# Patient Record
Sex: FEMALE | Race: WHITE | NOT HISPANIC OR LATINO
[De-identification: names, ages, dates, MRNs, and addresses within clinical notes are randomized per-mention and may not be internally consistent; named-entity substitution may affect disease eponyms.]

---

## 2021-03-30 ENCOUNTER — APPOINTMENT (OUTPATIENT)
Dept: CARDIOTHORACIC SURGERY | Facility: CLINIC | Age: 76
End: 2021-03-30
Payer: MEDICARE

## 2021-03-30 VITALS
BODY MASS INDEX: 24.14 KG/M2 | OXYGEN SATURATION: 97 % | SYSTOLIC BLOOD PRESSURE: 132 MMHG | WEIGHT: 115 LBS | DIASTOLIC BLOOD PRESSURE: 66 MMHG | HEART RATE: 56 BPM | HEIGHT: 58 IN

## 2021-03-30 DIAGNOSIS — Z86.59 PERSONAL HISTORY OF OTHER MENTAL AND BEHAVIORAL DISORDERS: ICD-10-CM

## 2021-03-30 DIAGNOSIS — Z82.49 FAMILY HISTORY OF ISCHEMIC HEART DISEASE AND OTHER DISEASES OF THE CIRCULATORY SYSTEM: ICD-10-CM

## 2021-03-30 PROCEDURE — 99205 OFFICE O/P NEW HI 60 MIN: CPT

## 2021-03-30 RX ORDER — INSULIN ASPART 100 [IU]/ML
100 INJECTION, SOLUTION INTRAVENOUS; SUBCUTANEOUS
Refills: 0 | Status: COMPLETED | COMMUNITY
End: 2021-03-30

## 2021-03-31 NOTE — DATA REVIEWED
[FreeTextEntry1] : 3.23.21 Cardiac Catheterization at [facility] \par   LVEF 40% \par   LM:  Left Main:  Normal \par   LAD:\par      Mid LAD There was 95% stenosis \par     1st Diagonal There was 90% stenosis \par    CX:\par       Mid Circumflex:  There was a 100% stenosis \par    RCA: Distal RCA There was 95% stenosis \par Severe CAD \par \par 3.23.21 Transthoracic echocardiogram at [facility]  \par -Left ventricle The cavity is normal. Normal thickness is present. Mild -Moderate diffuse hypocontractility of the left  ventricle is present. LVEF  40% \par -Right ventricle. The cavity is normal. Right ventricular systolic function is normal \par -Mitral valve: The leaflet is mildly thickened. There is trace  mitral valve regurgitation \par -Aortic valve: The valve is trileaflet/ The leaflets are normal thickness. There is no aortic stenosis. No valvular aortic regurgitation. \par -Tricuspid valve: The tricuspid leaflet are not thickened. There is trace tricuspid valve regurgitation. \par -Pericardium extracardiac. There is no pericardial effusion. \par

## 2021-03-31 NOTE — PHYSICAL EXAM
[Sclera] : the sclera and conjunctiva were normal [Oriented To Time, Place, And Person] : oriented to person, place, and time [General Appearance - Alert] : alert [Hearing Threshold Finger Rub Not [place]] : hearing was normal [Neck Cervical Mass (___cm)] : no neck mass was observed [Jugular Venous Distention Increased] : there was no jugular-venous distention [Exaggerated Use Of Accessory Muscles For Inspiration] : no accessory muscle use [Heart Rate And Rhythm] : heart rate was normal and rhythm regular [Examination Of The Chest] : the chest was normal in appearance [Chest Visual Inspection Thoracic Asymmetry] : no chest asymmetry [Sensation] : the sensory exam was normal to light touch and pinprick [Motor Exam] : the motor exam was normal [Neck Appearance] : the appearance of the neck was normal [] : the neck was supple [Arterial Pulses Femoral] : femoral pulses were normal without bruits [Arterial Pulses Carotid] : carotid pulses were normal with no bruits [Edema] : there was no peripheral edema [Bowel Sounds] : normal bowel sounds [Abdomen Soft] : soft [No CVA Tenderness] : no ~M costovertebral angle tenderness [FreeTextEntry1] : mild antalgic no assistive devices used.  [Abnormal Walk] : normal gait [Skin Color & Pigmentation] : normal skin color and pigmentation [Skin Turgor] : normal skin turgor

## 2021-03-31 NOTE — HISTORY OF PRESENT ILLNESS
[FreeTextEntry1] : Ms. CHEUNG is a 76 year old female referred by Dr. Meyers who presents for consultation. Her past medical history includes HLD, HTN, DM II, and GERD. She reports to the office today for evaluation of severe CAD discovered on cardiac catheterization at [facility] and echocardiogram 3.23.21. \par \par On  admission at [facility] her  chief complaints were chest pain, wheezing, clammy, perspiration,  shortness of breath, and weakness as per son. She was diagnosed with  elevated Troponin  and multivessel CAD.  She had several admissions at [facility] for similar symptoms. She had refused cardiac catheterization  in previous admission as per son. She is  under the care of  Dr. Sandoval, but has not yet seen him in over 1 year. Recommendation during her 3.23.21 admission at [facility]  was for open heart surgery,  but patient declined for reasons of seeking a second opinion. She is  here to discuss interventional management.

## 2021-03-31 NOTE — ASSESSMENT
[FreeTextEntry1] : I had the pleasure of evaluating Jose A. She  is accompanied by her son. Today I have  discussed symptoms in relation  with her 3.23.21 Transthoracic echocardiogram and cardiac catheterization at [facility]. \par On  admission at [facility] her chief complaints were chest pain, wheezing, clammy, perspiration,  shortness of breath, and weakness as per son. She was diagnosed with  elevated Troponin  and multivessel CAD.  She had several admissions at [facility] for similar symptoms prior. She had refused cardiac catheterization  in previous admission as per son. She is  under the care of  Dr. Sandoval, but has not yet seen him in over 1 year. After completing cardiac images  on 3.23.21 admission at [facility]  the recommendation was for open heart surgery,  but patient declined for reasons of seeking a second opinion. \par \par The patient's past medical history, past surgical history, family history, social history, allergies, medications, and multisystem review of systems were individually reviewed with the patient. The patient was personally seen and examined. I'm recommending and will schedule her CABG x 4  and a follow up with  Dr. Sandoval. Pre-operative education and post-operative activity discussed at length. All risks (1-2%), benefits, and alternatives discussed at length with patient. Expectations reviewed with patient. All questions addressed.  Patient agrees and will adhere with plan. \par \par PLAN: \par -Schedule CABG x  4  \par -Follow up Dr. Khan \par \par ICarmelita NP am scribing for and in the presence of Dr. Valenzuela the following sections HISTORY OF PRESENT ILLNESS, PAST MEDICAL/FAMILY/SOCIAL HISTORY; REVIEW OF SYSTEMS; VITAL SIGNS; PHYSICAL EXAM; DISPOSITION.\par \par "I personally performed the services described in the documentation, reviewed the documentation recorded by the scribe in my presence and accurately and completely records my words and actions."\par \par  \par

## 2021-03-31 NOTE — CONSULT LETTER
[Dear  ___] : Dear  [unfilled], [Consult Letter:] : I had the pleasure of evaluating your patient, [unfilled]. [Please see my note below.] : Please see my note below. [Consult Closing:] : Thank you very much for allowing me to participate in the care of this patient.  If you have any questions, please do not hesitate to contact me. [Sincerely,] : Sincerely, [DrHelena  ___] : Dr. CORDON [FreeTextEntry2] : Malik Meyers MD [FreeTextEntry3] : Cory Valenzuela MD\par  of Cardiothoracic Surgery\par [facility]\par [address] [place] [address] \[place] [address]\par [phone]\par

## 2021-04-11 ENCOUNTER — APPOINTMENT (OUTPATIENT)
Dept: DISASTER EMERGENCY | Facility: CLINIC | Age: 76
End: 2021-04-11

## 2021-04-12 LAB — SARS-COV-2 N GENE NPH QL NAA+PROBE: NOT DETECTED

## 2021-04-14 ENCOUNTER — OUTPATIENT (OUTPATIENT)
Dept: OUTPATIENT SERVICES | Facility: HOSPITAL | Age: 76
LOS: 1 days | End: 2021-04-14
Payer: MEDICARE

## 2021-04-14 ENCOUNTER — APPOINTMENT (OUTPATIENT)
Dept: PULMONOLOGY | Facility: CLINIC | Age: 76
End: 2021-04-14
Payer: MEDICARE

## 2021-04-14 ENCOUNTER — RESULT REVIEW (OUTPATIENT)
Age: 76
End: 2021-04-14

## 2021-04-14 VITALS
WEIGHT: 112.88 LBS | RESPIRATION RATE: 18 BRPM | HEART RATE: 78 BPM | DIASTOLIC BLOOD PRESSURE: 50 MMHG | HEIGHT: 57 IN | SYSTOLIC BLOOD PRESSURE: 98 MMHG | TEMPERATURE: 98 F

## 2021-04-14 VITALS — HEIGHT: 58 IN | TEMPERATURE: 98.6 F | BODY MASS INDEX: 23.72 KG/M2 | WEIGHT: 113 LBS

## 2021-04-14 DIAGNOSIS — Z29.9 ENCOUNTER FOR PROPHYLACTIC MEASURES, UNSPECIFIED: ICD-10-CM

## 2021-04-14 DIAGNOSIS — I25.10 ATHEROSCLEROTIC HEART DISEASE OF NATIVE CORONARY ARTERY WITHOUT ANGINA PECTORIS: ICD-10-CM

## 2021-04-14 DIAGNOSIS — Z98.891 HISTORY OF UTERINE SCAR FROM PREVIOUS SURGERY: Chronic | ICD-10-CM

## 2021-04-14 DIAGNOSIS — Z13.89 ENCOUNTER FOR SCREENING FOR OTHER DISORDER: ICD-10-CM

## 2021-04-14 DIAGNOSIS — E11.9 TYPE 2 DIABETES MELLITUS WITHOUT COMPLICATIONS: ICD-10-CM

## 2021-04-14 DIAGNOSIS — I10 ESSENTIAL (PRIMARY) HYPERTENSION: ICD-10-CM

## 2021-04-14 DIAGNOSIS — Z01.818 ENCOUNTER FOR OTHER PREPROCEDURAL EXAMINATION: ICD-10-CM

## 2021-04-14 LAB
A1C WITH ESTIMATED AVERAGE GLUCOSE RESULT: 9.4 % — HIGH (ref 4–5.6)
ALBUMIN SERPL ELPH-MCNC: 4.2 G/DL — SIGNIFICANT CHANGE UP (ref 3.3–5.2)
ALP SERPL-CCNC: 104 U/L — SIGNIFICANT CHANGE UP (ref 40–120)
ALT FLD-CCNC: 11 U/L — SIGNIFICANT CHANGE UP
ANION GAP SERPL CALC-SCNC: 14 MMOL/L — SIGNIFICANT CHANGE UP (ref 5–17)
APPEARANCE UR: CLEAR — SIGNIFICANT CHANGE UP
APTT BLD: 30.3 SEC — SIGNIFICANT CHANGE UP (ref 27.5–35.5)
AST SERPL-CCNC: 15 U/L — SIGNIFICANT CHANGE UP
BACTERIA # UR AUTO: ABNORMAL
BASOPHILS # BLD AUTO: 0.06 K/UL — SIGNIFICANT CHANGE UP (ref 0–0.2)
BASOPHILS NFR BLD AUTO: 0.8 % — SIGNIFICANT CHANGE UP (ref 0–2)
BILIRUB SERPL-MCNC: 0.3 MG/DL — LOW (ref 0.4–2)
BILIRUB UR-MCNC: NEGATIVE — SIGNIFICANT CHANGE UP
BLD GP AB SCN SERPL QL: SIGNIFICANT CHANGE UP
BUN SERPL-MCNC: 37 MG/DL — HIGH (ref 8–20)
CALCIUM SERPL-MCNC: 9.2 MG/DL — SIGNIFICANT CHANGE UP (ref 8.6–10.2)
CHLORIDE SERPL-SCNC: 101 MMOL/L — SIGNIFICANT CHANGE UP (ref 98–107)
CO2 SERPL-SCNC: 25 MMOL/L — SIGNIFICANT CHANGE UP (ref 22–29)
COLOR SPEC: YELLOW — SIGNIFICANT CHANGE UP
CREAT SERPL-MCNC: 1.1 MG/DL — SIGNIFICANT CHANGE UP (ref 0.5–1.3)
DIFF PNL FLD: ABNORMAL
EOSINOPHIL # BLD AUTO: 0.44 K/UL — SIGNIFICANT CHANGE UP (ref 0–0.5)
EOSINOPHIL NFR BLD AUTO: 5.5 % — SIGNIFICANT CHANGE UP (ref 0–6)
EPI CELLS # UR: SIGNIFICANT CHANGE UP
ESTIMATED AVERAGE GLUCOSE: 223 MG/DL — HIGH (ref 68–114)
GLUCOSE SERPL-MCNC: 290 MG/DL — HIGH (ref 70–99)
GLUCOSE UR QL: NEGATIVE MG/DL — SIGNIFICANT CHANGE UP
HCT VFR BLD CALC: 44.8 % — SIGNIFICANT CHANGE UP (ref 34.5–45)
HGB BLD-MCNC: 13.9 G/DL — SIGNIFICANT CHANGE UP (ref 11.5–15.5)
IMM GRANULOCYTES NFR BLD AUTO: 0.3 % — SIGNIFICANT CHANGE UP (ref 0–1.5)
INR BLD: 1.05 RATIO — SIGNIFICANT CHANGE UP (ref 0.88–1.16)
KETONES UR-MCNC: NEGATIVE — SIGNIFICANT CHANGE UP
LEUKOCYTE ESTERASE UR-ACNC: NEGATIVE — SIGNIFICANT CHANGE UP
LYMPHOCYTES # BLD AUTO: 2.18 K/UL — SIGNIFICANT CHANGE UP (ref 1–3.3)
LYMPHOCYTES # BLD AUTO: 27.4 % — SIGNIFICANT CHANGE UP (ref 13–44)
MCHC RBC-ENTMCNC: 26.3 PG — LOW (ref 27–34)
MCHC RBC-ENTMCNC: 31 GM/DL — LOW (ref 32–36)
MCV RBC AUTO: 84.8 FL — SIGNIFICANT CHANGE UP (ref 80–100)
MONOCYTES # BLD AUTO: 0.67 K/UL — SIGNIFICANT CHANGE UP (ref 0–0.9)
MONOCYTES NFR BLD AUTO: 8.4 % — SIGNIFICANT CHANGE UP (ref 2–14)
NEUTROPHILS # BLD AUTO: 4.59 K/UL — SIGNIFICANT CHANGE UP (ref 1.8–7.4)
NEUTROPHILS NFR BLD AUTO: 57.6 % — SIGNIFICANT CHANGE UP (ref 43–77)
NITRITE UR-MCNC: NEGATIVE — SIGNIFICANT CHANGE UP
NT-PROBNP SERPL-SCNC: 2438 PG/ML — HIGH (ref 0–300)
PH UR: 6 — SIGNIFICANT CHANGE UP (ref 5–8)
PLATELET # BLD AUTO: 292 K/UL — SIGNIFICANT CHANGE UP (ref 150–400)
POTASSIUM SERPL-MCNC: 4.3 MMOL/L — SIGNIFICANT CHANGE UP (ref 3.5–5.3)
POTASSIUM SERPL-SCNC: 4.3 MMOL/L — SIGNIFICANT CHANGE UP (ref 3.5–5.3)
PREALB SERPL-MCNC: 28 MG/DL — SIGNIFICANT CHANGE UP (ref 18–38)
PROT SERPL-MCNC: 7.7 G/DL — SIGNIFICANT CHANGE UP (ref 6.6–8.7)
PROT UR-MCNC: 30 MG/DL
PROTHROM AB SERPL-ACNC: 12.2 SEC — SIGNIFICANT CHANGE UP (ref 10.6–13.6)
RBC # BLD: 5.28 M/UL — HIGH (ref 3.8–5.2)
RBC # FLD: 15.3 % — HIGH (ref 10.3–14.5)
RBC CASTS # UR COMP ASSIST: SIGNIFICANT CHANGE UP /HPF (ref 0–4)
SODIUM SERPL-SCNC: 140 MMOL/L — SIGNIFICANT CHANGE UP (ref 135–145)
SP GR SPEC: 1.01 — SIGNIFICANT CHANGE UP (ref 1.01–1.02)
T3 SERPL-MCNC: 84 NG/DL — SIGNIFICANT CHANGE UP (ref 80–200)
T4 AB SER-ACNC: 5.7 UG/DL — SIGNIFICANT CHANGE UP (ref 4.5–12)
TSH SERPL-MCNC: 2.75 UIU/ML — SIGNIFICANT CHANGE UP (ref 0.27–4.2)
UROBILINOGEN FLD QL: NEGATIVE MG/DL — SIGNIFICANT CHANGE UP
WBC # BLD: 7.96 K/UL — SIGNIFICANT CHANGE UP (ref 3.8–10.5)
WBC # FLD AUTO: 7.96 K/UL — SIGNIFICANT CHANGE UP (ref 3.8–10.5)
WBC UR QL: SIGNIFICANT CHANGE UP

## 2021-04-14 PROCEDURE — 94729 DIFFUSING CAPACITY: CPT

## 2021-04-14 PROCEDURE — 71046 X-RAY EXAM CHEST 2 VIEWS: CPT

## 2021-04-14 PROCEDURE — G0463: CPT

## 2021-04-14 PROCEDURE — 93005 ELECTROCARDIOGRAM TRACING: CPT

## 2021-04-14 PROCEDURE — 94010 BREATHING CAPACITY TEST: CPT

## 2021-04-14 PROCEDURE — 93010 ELECTROCARDIOGRAM REPORT: CPT

## 2021-04-14 PROCEDURE — 93880 EXTRACRANIAL BILAT STUDY: CPT | Mod: 26

## 2021-04-14 PROCEDURE — 94727 GAS DIL/WSHOT DETER LNG VOL: CPT

## 2021-04-14 PROCEDURE — 71046 X-RAY EXAM CHEST 2 VIEWS: CPT | Mod: 26

## 2021-04-14 PROCEDURE — 85018 HEMOGLOBIN: CPT | Mod: QW

## 2021-04-14 RX ORDER — ISOSORBIDE MONONITRATE 60 MG/1
0 TABLET, EXTENDED RELEASE ORAL
Qty: 0 | Refills: 0 | DISCHARGE

## 2021-04-14 RX ORDER — ISOSORBIDE MONONITRATE 60 MG/1
30 TABLET, EXTENDED RELEASE ORAL
Qty: 0 | Refills: 0 | DISCHARGE

## 2021-04-14 RX ORDER — LOSARTAN POTASSIUM 100 MG/1
20 TABLET, FILM COATED ORAL
Qty: 0 | Refills: 0 | DISCHARGE

## 2021-04-14 RX ORDER — GLIMEPIRIDE 1 MG
1 TABLET ORAL
Qty: 0 | Refills: 0 | DISCHARGE

## 2021-04-14 RX ORDER — LOSARTAN POTASSIUM 100 MG/1
0 TABLET, FILM COATED ORAL
Qty: 0 | Refills: 0 | DISCHARGE

## 2021-04-14 NOTE — H&P PST ADULT - NSANTHOSAYNRD_GEN_A_CORE
No. ATTILA screening performed.  STOP BANG Legend: 0-2 = LOW Risk; 3-4 = INTERMEDIATE Risk; 5-8 = HIGH Risk

## 2021-04-14 NOTE — H&P PST ADULT - NSICDXPASTMEDICALHX_GEN_ALL_CORE_FT
PAST MEDICAL HISTORY:  CAD (coronary artery disease)     H/O myocardial ischemia     Hyperlipemia     Hypertension     Type 2 diabetes mellitus      PAST MEDICAL HISTORY:  At risk for sleep apnea Bang 5    CAD (coronary artery disease)     Congestive heart failure     H/O myocardial ischemia     Hyperlipemia     Hypertension     Type 2 diabetes mellitus      PAST MEDICAL HISTORY:  At risk for sleep apnea Bang 5    CAD (coronary artery disease)     Congestive heart failure     Dementia     H/O myocardial ischemia     Hyperlipemia     Hypertension     Type 2 diabetes mellitus

## 2021-04-14 NOTE — H&P PST ADULT - EKG AND INTERPRETATION
Sinus bradycardia  ST & T wave abnormality, consider inferior ischemia  ST & T wave abnormality, consider anterolateral ischemia  Pending final interpretation

## 2021-04-14 NOTE — H&P PST ADULT - HISTORY OF PRESENT ILLNESS
Patient reports a history of heart disease for about 20 years. Reports that she was told about a mnth ago she might had a myocardial infarction. reports that she had 3-4 vessels blockage C/o upper back pain. Deneis any fever, chills, SOB 75 y/o female presents to PST today, Patient reports a history of heart disease for about 20 years. Reports that she was told about a month ago that she had a myocardial infarction. As per son, Patient has about 3-4 vessels blockage.  C/o upper chest pain  radiating through upper back. Denies any fever, SOB, Chills. Scheduled for CABG x4 with Dr. Valenzuela 4/19/2021.  75 y/o female presents to [facility] today. Patient reports a history of heart disease for about 20 years. Reports that she was told about a month ago that she had a myocardial infarction. As per son, Patient has about 3-4 vessels blockages in the heart. Patient c/o chest pain  radiating through right upper back. Denies any fever, SOB, Chills. Scheduled for CABG x4 with Dr. Valenzuela 4/19/2021.  77 y/o female presents to [facility] today. Patient reports a history of heart disease for about 20 years. Reports that she was told about a month ago that she had a myocardial infarction. As per son, Patient has about 3-4 nblocked vessels in the heart. Patient c/o chest pain  radiating through right upper back. Denies any fever, SOB, Chills. Scheduled for CABG x4 with Dr. Valenzuela 4/19/2021.

## 2021-04-14 NOTE — H&P PST ADULT - ASSESSMENT
75 y/o female scheduled for CaBG x4 with Dr. Nicolas Ray on 2021    OPIOID RISK TOOL    JANNY EACH BOX THAT APPLIES AND ADD TOTALS AT THE END    FAMILY HISTORY OF SUBSTANCE ABUSE                 FEMALE         MALE                                                Alcohol                             [  ]1 pt          [  ]3pts                                               Illegal Durgs                     [  ]2 pts        [  ]3pts                                               Rx Drugs                           [  ]4 pts        [  ]4 pts    PERSONAL HISTORY OF SUBSTANCE ABUSE                                                                                          Alcohol                             [  ]3 pts       [  ]3 pts                                               Illegal Drugs                     [  ]4 pts        [  ]4 pts                                               Rx Drugs                           [  ]5 pts        [  ]5 pts    AGE BETWEEN 16-45 YEARS                                      [  ]1 pt         [  ]1 pt    HISTORY OF PREADOLESCENT   SEXUAL ABUSE                                                             [  ]3 pts        [  ]0pts    PSYCHOLOGICAL DISEASE                     ADD, OCD, Bipolar, Schizophrenia        [  ]2 pts         [  ]2 pts                      Depression                                               [x  ]1 pt           [  ]1 pt           SCORING TOTAL   (add numbers and type here)              (1)                                     A score of 3 or lower indicated LOW risk for future opioid abuse  A score of 4 to 7 indicated moderate risk for future opioid abuse  A score of 8 or higher indicates a high risk for opioid abuse    CAPRINI SCORE [CLOT]    AGE RELATED RISK FACTORS                                                       MOBILITY RELATED FACTORS  [ ] Age 41-60 years                                            (1 Point)                  [ ] Bed rest                                                        (1 Point)  [ ] Age: 61-74 years                                           (2 Points)                 [ ] Plaster cast                                                   (2 Points)  [ ] Age= 75 years                                              (3 Points)                 [ ] Bed bound for more than 72 hours                 (2 Points)    DISEASE RELATED RISK FACTORS                                               GENDER SPECIFIC FACTORS  [ ] Edema in the lower extremities                       (1 Point)                  [ ] Pregnancy                                                     (1 Point)  [ ] Varicose veins                                               (1 Point)                  [ ] Post-partum < 6 weeks                                   (1 Point)             [ ] BMI > 25 Kg/m2                                            (1 Point)                  [ ] Hormonal therapy  or oral contraception          (1 Point)                 [ ] Sepsis (in the previous month)                        (1 Point)                  [ ] History of pregnancy complications                 (1 point)  [ ] Pneumonia or serious lung disease                                               [ ] Unexplained or recurrent                     (1 Point)           (in the previous month)                               (1 Point)  [ ] Abnormal pulmonary function test                     (1 Point)                 SURGERY RELATED RISK FACTORS  [ ] Acute myocardial infarction                              (1 Point)                 [ ]  Section                                             (1 Point)  [ x] Congestive heart failure (in the previous month)  (1 Point)               [ ] Minor surgery                                                  (1 Point)   [ ] Inflammatory bowel disease                             (1 Point)                 [ ] Arthroscopic surgery                                        (2 Points)  [ ] Central venous access                                      (2 Points)                [x ] General surgery lasting more than 45 minutes   (2 Points)       [ ] Stroke (in the previous month)                          (5 Points)               [ ] Elective arthroplasty                                         (5 Points)                                                                                                                                               HEMATOLOGY RELATED FACTORS                                                 TRAUMA RELATED RISK FACTORS  [ ] Prior episodes of VTE                                     (3 Points)                [ ] Fracture of the hip, pelvis, or leg                       (5 Points)  [ ] Positive family history for VTE                         (3 Points)                 [ ] Acute spinal cord injury (in the previous month)  (5 Points)  [ ] Prothrombin 30025 A                                     (3 Points)                 [ ] Paralysis  (less than 1 month)                             (5 Points)  [ ] Factor V Leiden                                             (3 Points)                  [ ] Multiple Trauma within 1 month                        (5 Points)  [ ] Lupus anticoagulants                                     (3 Points)                                                           [ ] Anticardiolipin antibodies                               (3 Points)                                                       [ ] High homocysteine in the blood                      (3 Points)                                             [ ] Other congenital or acquired thrombophilia      (3 Points)                                                [ ] Heparin induced thrombocytopenia                  (3 Points)                                          Total Score [     3    ]    Caprini Score 0 - 2:  Low Risk, No VTE Prophylaxis required for most patients, encourage ambulation  Caprini Score 3 - 6:  At Risk, pharmacologic VTE prophylaxis is indicated for most patients (in the absence of a contraindication)  Caprini Score Greater than or = 7:  High Risk, pharmacologic VTE prophylaxis is indicated for most patients (in the absence of a contraindication)

## 2021-04-14 NOTE — H&P PST ADULT - NSICDXFAMILYHX_GEN_ALL_CORE_FT
FAMILY HISTORY:  Sibling  Still living? Yes, Estimated age: 71-80  FH: diabetes mellitus, Age at diagnosis: Age Unknown  FH: heart disease, Age at diagnosis: Age Unknown    Child  Still living? Unknown  FH: diabetes mellitus, Age at diagnosis: Age Unknown

## 2021-04-14 NOTE — H&P PST ADULT - NSICDXPROBLEM_GEN_ALL_CORE_FT
PROBLEM DIAGNOSES  Problem: Screening for substance abuse  Assessment and Plan: ORT 1    Problem: Need for prophylactic measure  Assessment and Plan: Caprini 3- Moderate risk  Primary team please assess need for DVT prophylaxis    Problem: Diabetes mellitus  Assessment and Plan: Follows with PCP    Problem: Hypertension  Assessment and Plan: Follows with PCP    Problem: Atherosclerotic heart disease of native coronary artery without angina pectoris  Assessment and Plan: Scheduled for coronary artery bypass graft x4 with Dr. Valenzuela 4/19/2021

## 2021-04-15 DIAGNOSIS — Z01.818 ENCOUNTER FOR OTHER PREPROCEDURAL EXAMINATION: ICD-10-CM

## 2021-04-15 LAB
CULTURE RESULTS: SIGNIFICANT CHANGE UP
MRSA PCR RESULT.: SIGNIFICANT CHANGE UP
S AUREUS DNA NOSE QL NAA+PROBE: SIGNIFICANT CHANGE UP
SPECIMEN SOURCE: SIGNIFICANT CHANGE UP

## 2021-04-16 ENCOUNTER — APPOINTMENT (OUTPATIENT)
Dept: DISASTER EMERGENCY | Facility: CLINIC | Age: 76
End: 2021-04-16

## 2021-04-17 LAB — SARS-COV-2 N GENE NPH QL NAA+PROBE: NOT DETECTED

## 2021-04-19 ENCOUNTER — RESULT REVIEW (OUTPATIENT)
Age: 76
End: 2021-04-19

## 2021-04-19 ENCOUNTER — INPATIENT (INPATIENT)
Facility: HOSPITAL | Age: 76
LOS: 6 days | Discharge: HOME | DRG: 235 | End: 2021-04-26
Attending: THORACIC SURGERY (CARDIOTHORACIC VASCULAR SURGERY) | Admitting: THORACIC SURGERY (CARDIOTHORACIC VASCULAR SURGERY)
Payer: MEDICARE

## 2021-04-19 ENCOUNTER — APPOINTMENT (OUTPATIENT)
Dept: CARDIOTHORACIC SURGERY | Facility: HOSPITAL | Age: 76
End: 2021-04-19

## 2021-04-19 VITALS
DIASTOLIC BLOOD PRESSURE: 115 MMHG | SYSTOLIC BLOOD PRESSURE: 138 MMHG | RESPIRATION RATE: 16 BRPM | WEIGHT: 112.88 LBS | OXYGEN SATURATION: 99 % | HEIGHT: 55 IN | HEART RATE: 63 BPM | TEMPERATURE: 99 F

## 2021-04-19 DIAGNOSIS — Z98.891 HISTORY OF UTERINE SCAR FROM PREVIOUS SURGERY: Chronic | ICD-10-CM

## 2021-04-19 DIAGNOSIS — I25.10 ATHEROSCLEROTIC HEART DISEASE OF NATIVE CORONARY ARTERY WITHOUT ANGINA PECTORIS: ICD-10-CM

## 2021-04-19 LAB
ABO RH CONFIRMATION: SIGNIFICANT CHANGE UP
ALBUMIN SERPL ELPH-MCNC: 3.9 G/DL — SIGNIFICANT CHANGE UP (ref 3.3–5.2)
ALP SERPL-CCNC: 47 U/L — SIGNIFICANT CHANGE UP (ref 40–120)
ALT FLD-CCNC: 8 U/L — SIGNIFICANT CHANGE UP
ANION GAP SERPL CALC-SCNC: 13 MMOL/L — SIGNIFICANT CHANGE UP (ref 5–17)
AST SERPL-CCNC: 22 U/L — SIGNIFICANT CHANGE UP
BILIRUB SERPL-MCNC: 0.5 MG/DL — SIGNIFICANT CHANGE UP (ref 0.4–2)
BUN SERPL-MCNC: 22 MG/DL — HIGH (ref 8–20)
CALCIUM SERPL-MCNC: 8.7 MG/DL — SIGNIFICANT CHANGE UP (ref 8.6–10.2)
CHLORIDE SERPL-SCNC: 105 MMOL/L — SIGNIFICANT CHANGE UP (ref 98–107)
CO2 SERPL-SCNC: 23 MMOL/L — SIGNIFICANT CHANGE UP (ref 22–29)
CREAT SERPL-MCNC: 0.69 MG/DL — SIGNIFICANT CHANGE UP (ref 0.5–1.3)
GAS PNL BLDA: SIGNIFICANT CHANGE UP
GAS PNL BLDA: SIGNIFICANT CHANGE UP
GLUCOSE BLDC GLUCOMTR-MCNC: 132 MG/DL — HIGH (ref 70–99)
GLUCOSE BLDC GLUCOMTR-MCNC: 145 MG/DL — HIGH (ref 70–99)
GLUCOSE BLDC GLUCOMTR-MCNC: 148 MG/DL — HIGH (ref 70–99)
GLUCOSE BLDC GLUCOMTR-MCNC: 153 MG/DL — HIGH (ref 70–99)
GLUCOSE BLDC GLUCOMTR-MCNC: 154 MG/DL — HIGH (ref 70–99)
GLUCOSE BLDC GLUCOMTR-MCNC: 157 MG/DL — HIGH (ref 70–99)
GLUCOSE BLDC GLUCOMTR-MCNC: 160 MG/DL — HIGH (ref 70–99)
GLUCOSE BLDC GLUCOMTR-MCNC: 167 MG/DL — HIGH (ref 70–99)
GLUCOSE BLDC GLUCOMTR-MCNC: 168 MG/DL — HIGH (ref 70–99)
GLUCOSE BLDC GLUCOMTR-MCNC: 169 MG/DL — HIGH (ref 70–99)
GLUCOSE BLDC GLUCOMTR-MCNC: 174 MG/DL — HIGH (ref 70–99)
GLUCOSE BLDC GLUCOMTR-MCNC: 179 MG/DL — HIGH (ref 70–99)
GLUCOSE SERPL-MCNC: 197 MG/DL — HIGH (ref 70–99)
HCT VFR BLD CALC: 29.1 % — LOW (ref 34.5–45)
HGB BLD-MCNC: 9.5 G/DL — LOW (ref 11.5–15.5)
INR BLD: 1.27 RATIO — HIGH (ref 0.88–1.16)
MAGNESIUM SERPL-MCNC: 3.2 MG/DL — HIGH (ref 1.6–2.6)
MCHC RBC-ENTMCNC: 27.1 PG — SIGNIFICANT CHANGE UP (ref 27–34)
MCHC RBC-ENTMCNC: 32.6 GM/DL — SIGNIFICANT CHANGE UP (ref 32–36)
MCV RBC AUTO: 82.9 FL — SIGNIFICANT CHANGE UP (ref 80–100)
PLATELET # BLD AUTO: 158 K/UL — SIGNIFICANT CHANGE UP (ref 150–400)
POTASSIUM SERPL-MCNC: 4.2 MMOL/L — SIGNIFICANT CHANGE UP (ref 3.5–5.3)
POTASSIUM SERPL-SCNC: 4.2 MMOL/L — SIGNIFICANT CHANGE UP (ref 3.5–5.3)
PROT SERPL-MCNC: 5.4 G/DL — LOW (ref 6.6–8.7)
PROTHROM AB SERPL-ACNC: 14.6 SEC — HIGH (ref 10.6–13.6)
RBC # BLD: 3.51 M/UL — LOW (ref 3.8–5.2)
RBC # FLD: 15.3 % — HIGH (ref 10.3–14.5)
SODIUM SERPL-SCNC: 141 MMOL/L — SIGNIFICANT CHANGE UP (ref 135–145)
TROPONIN T SERPL-MCNC: 0.35 NG/ML — HIGH (ref 0–0.06)
WBC # BLD: 14.83 K/UL — HIGH (ref 3.8–10.5)
WBC # FLD AUTO: 14.83 K/UL — HIGH (ref 3.8–10.5)

## 2021-04-19 PROCEDURE — 88304 TISSUE EXAM BY PATHOLOGIST: CPT | Mod: 26

## 2021-04-19 PROCEDURE — 76998 US GUIDE INTRAOP: CPT | Mod: 26,AS,59

## 2021-04-19 PROCEDURE — 33572 OPEN CORONARY ENDARTERECTOMY: CPT

## 2021-04-19 PROCEDURE — 33518 CABG ARTERY-VEIN TWO: CPT | Mod: AS

## 2021-04-19 PROCEDURE — 33533 CABG ARTERIAL SINGLE: CPT | Mod: AS

## 2021-04-19 PROCEDURE — 33518 CABG ARTERY-VEIN TWO: CPT

## 2021-04-19 PROCEDURE — 76998 US GUIDE INTRAOP: CPT | Mod: 26,59

## 2021-04-19 PROCEDURE — 33533 CABG ARTERIAL SINGLE: CPT

## 2021-04-19 PROCEDURE — 71045 X-RAY EXAM CHEST 1 VIEW: CPT | Mod: 26

## 2021-04-19 PROCEDURE — 88311 DECALCIFY TISSUE: CPT | Mod: 26

## 2021-04-19 PROCEDURE — 99223 1ST HOSP IP/OBS HIGH 75: CPT

## 2021-04-19 PROCEDURE — 93010 ELECTROCARDIOGRAM REPORT: CPT

## 2021-04-19 RX ORDER — NOREPINEPHRINE BITARTRATE/D5W 8 MG/250ML
0.05 PLASTIC BAG, INJECTION (ML) INTRAVENOUS
Qty: 8 | Refills: 0 | Status: DISCONTINUED | OUTPATIENT
Start: 2021-04-19 | End: 2021-04-20

## 2021-04-19 RX ORDER — POTASSIUM CHLORIDE 20 MEQ
10 PACKET (EA) ORAL
Refills: 0 | Status: DISCONTINUED | OUTPATIENT
Start: 2021-04-19 | End: 2021-04-20

## 2021-04-19 RX ORDER — INSULIN HUMAN 100 [IU]/ML
2 INJECTION, SOLUTION SUBCUTANEOUS
Qty: 50 | Refills: 0 | Status: DISCONTINUED | OUTPATIENT
Start: 2021-04-19 | End: 2021-04-20

## 2021-04-19 RX ORDER — CLOPIDOGREL BISULFATE 75 MG/1
75 TABLET, FILM COATED ORAL DAILY
Refills: 0 | Status: DISCONTINUED | OUTPATIENT
Start: 2021-04-19 | End: 2021-04-26

## 2021-04-19 RX ORDER — DEXTROSE 50 % IN WATER 50 %
25 SYRINGE (ML) INTRAVENOUS
Refills: 0 | Status: DISCONTINUED | OUTPATIENT
Start: 2021-04-19 | End: 2021-04-20

## 2021-04-19 RX ORDER — CEFUROXIME AXETIL 250 MG
1500 TABLET ORAL EVERY 8 HOURS
Refills: 0 | Status: COMPLETED | OUTPATIENT
Start: 2021-04-19 | End: 2021-04-21

## 2021-04-19 RX ORDER — ACETAMINOPHEN 500 MG
1000 TABLET ORAL ONCE
Refills: 0 | Status: DISCONTINUED | OUTPATIENT
Start: 2021-04-19 | End: 2021-04-19

## 2021-04-19 RX ORDER — CHLORHEXIDINE GLUCONATE 213 G/1000ML
5 SOLUTION TOPICAL
Refills: 0 | Status: DISCONTINUED | OUTPATIENT
Start: 2021-04-19 | End: 2021-04-21

## 2021-04-19 RX ORDER — ASPIRIN/CALCIUM CARB/MAGNESIUM 324 MG
325 TABLET ORAL DAILY
Refills: 0 | Status: DISCONTINUED | OUTPATIENT
Start: 2021-04-19 | End: 2021-04-26

## 2021-04-19 RX ORDER — OXYCODONE HYDROCHLORIDE 5 MG/1
10 TABLET ORAL EVERY 4 HOURS
Refills: 0 | Status: DISCONTINUED | OUTPATIENT
Start: 2021-04-19 | End: 2021-04-20

## 2021-04-19 RX ORDER — PANTOPRAZOLE SODIUM 20 MG/1
40 TABLET, DELAYED RELEASE ORAL ONCE
Refills: 0 | Status: COMPLETED | OUTPATIENT
Start: 2021-04-19 | End: 2021-04-19

## 2021-04-19 RX ORDER — VANCOMYCIN HCL 1 G
750 VIAL (EA) INTRAVENOUS EVERY 12 HOURS
Refills: 0 | Status: COMPLETED | OUTPATIENT
Start: 2021-04-19 | End: 2021-04-21

## 2021-04-19 RX ORDER — MEPERIDINE HYDROCHLORIDE 50 MG/ML
25 INJECTION INTRAMUSCULAR; INTRAVENOUS; SUBCUTANEOUS ONCE
Refills: 0 | Status: DISCONTINUED | OUTPATIENT
Start: 2021-04-19 | End: 2021-04-19

## 2021-04-19 RX ORDER — ALBUMIN HUMAN 25 %
250 VIAL (ML) INTRAVENOUS ONCE
Refills: 0 | Status: COMPLETED | OUTPATIENT
Start: 2021-04-19 | End: 2021-04-19

## 2021-04-19 RX ORDER — ACETAMINOPHEN 500 MG
750 TABLET ORAL ONCE
Refills: 0 | Status: COMPLETED | OUTPATIENT
Start: 2021-04-19 | End: 2021-04-19

## 2021-04-19 RX ORDER — SODIUM CHLORIDE 9 MG/ML
3 INJECTION INTRAMUSCULAR; INTRAVENOUS; SUBCUTANEOUS EVERY 8 HOURS
Refills: 0 | Status: DISCONTINUED | OUTPATIENT
Start: 2021-04-19 | End: 2021-04-19

## 2021-04-19 RX ORDER — CEFUROXIME AXETIL 250 MG
1500 TABLET ORAL ONCE
Refills: 0 | Status: DISCONTINUED | OUTPATIENT
Start: 2021-04-19 | End: 2021-04-19

## 2021-04-19 RX ORDER — FENTANYL CITRATE 50 UG/ML
50 INJECTION INTRAVENOUS
Refills: 0 | Status: DISCONTINUED | OUTPATIENT
Start: 2021-04-19 | End: 2021-04-19

## 2021-04-19 RX ORDER — CHLORHEXIDINE GLUCONATE 213 G/1000ML
15 SOLUTION TOPICAL EVERY 12 HOURS
Refills: 0 | Status: DISCONTINUED | OUTPATIENT
Start: 2021-04-19 | End: 2021-04-19

## 2021-04-19 RX ORDER — POTASSIUM CHLORIDE 20 MEQ
10 PACKET (EA) ORAL
Refills: 0 | Status: COMPLETED | OUTPATIENT
Start: 2021-04-19 | End: 2021-04-19

## 2021-04-19 RX ORDER — ACETAMINOPHEN 500 MG
650 TABLET ORAL EVERY 6 HOURS
Refills: 0 | Status: DISCONTINUED | OUTPATIENT
Start: 2021-04-19 | End: 2021-04-26

## 2021-04-19 RX ORDER — PANTOPRAZOLE SODIUM 20 MG/1
40 TABLET, DELAYED RELEASE ORAL DAILY
Refills: 0 | Status: DISCONTINUED | OUTPATIENT
Start: 2021-04-20 | End: 2021-04-26

## 2021-04-19 RX ORDER — DEXTROSE 50 % IN WATER 50 %
50 SYRINGE (ML) INTRAVENOUS
Refills: 0 | Status: DISCONTINUED | OUTPATIENT
Start: 2021-04-19 | End: 2021-04-20

## 2021-04-19 RX ORDER — OXYCODONE HYDROCHLORIDE 5 MG/1
5 TABLET ORAL EVERY 4 HOURS
Refills: 0 | Status: DISCONTINUED | OUTPATIENT
Start: 2021-04-19 | End: 2021-04-25

## 2021-04-19 RX ORDER — PROPOFOL 10 MG/ML
5 INJECTION, EMULSION INTRAVENOUS
Qty: 1000 | Refills: 0 | Status: DISCONTINUED | OUTPATIENT
Start: 2021-04-19 | End: 2021-04-19

## 2021-04-19 RX ORDER — SODIUM CHLORIDE 9 MG/ML
1000 INJECTION INTRAMUSCULAR; INTRAVENOUS; SUBCUTANEOUS
Refills: 0 | Status: DISCONTINUED | OUTPATIENT
Start: 2021-04-19 | End: 2021-04-21

## 2021-04-19 RX ORDER — PANTOPRAZOLE SODIUM 20 MG/1
40 TABLET, DELAYED RELEASE ORAL DAILY
Refills: 0 | Status: DISCONTINUED | OUTPATIENT
Start: 2021-04-19 | End: 2021-04-19

## 2021-04-19 RX ORDER — METOPROLOL TARTRATE 50 MG
25 TABLET ORAL
Refills: 0 | Status: DISCONTINUED | OUTPATIENT
Start: 2021-04-20 | End: 2021-04-23

## 2021-04-19 RX ADMIN — CLOPIDOGREL BISULFATE 75 MILLIGRAM(S): 75 TABLET, FILM COATED ORAL at 20:59

## 2021-04-19 RX ADMIN — Medication 100 MILLIGRAM(S): at 16:41

## 2021-04-19 RX ADMIN — Medication 100 MILLIEQUIVALENT(S): at 14:09

## 2021-04-19 RX ADMIN — Medication 125 MILLILITER(S): at 18:16

## 2021-04-19 RX ADMIN — CHLORHEXIDINE GLUCONATE 5 MILLILITER(S): 213 SOLUTION TOPICAL at 17:16

## 2021-04-19 RX ADMIN — OXYCODONE HYDROCHLORIDE 5 MILLIGRAM(S): 5 TABLET ORAL at 21:15

## 2021-04-19 RX ADMIN — Medication 750 MILLIGRAM(S): at 18:50

## 2021-04-19 RX ADMIN — Medication 125 MILLILITER(S): at 17:49

## 2021-04-19 RX ADMIN — Medication 325 MILLIGRAM(S): at 20:59

## 2021-04-19 RX ADMIN — Medication 100 MILLIEQUIVALENT(S): at 13:30

## 2021-04-19 RX ADMIN — Medication 125 MILLILITER(S): at 13:08

## 2021-04-19 RX ADMIN — Medication 250 MILLIGRAM(S): at 20:59

## 2021-04-19 RX ADMIN — OXYCODONE HYDROCHLORIDE 5 MILLIGRAM(S): 5 TABLET ORAL at 20:59

## 2021-04-19 RX ADMIN — Medication 300 MILLIGRAM(S): at 18:35

## 2021-04-19 RX ADMIN — PANTOPRAZOLE SODIUM 40 MILLIGRAM(S): 20 TABLET, DELAYED RELEASE ORAL at 16:45

## 2021-04-19 NOTE — BRIEF OPERATIVE NOTE - NSICDXBRIEFPROCEDURE_GEN_ALL_CORE_FT
PROCEDURES:  CABG, 1 arterial and 2 venous 19-Apr-2021 12:16:27  Jose Cruz  Coronary endarterectomy 19-Apr-2021 12:16:56 LAD Jose Cruz   PROCEDURES:  CABG, 1 arterial and 2 venous 19-Apr-2021 12:16:27 LIMA to LAD, SVG to RPDA, SVG to DIAG Jose Cruz  Coronary endarterectomy 19-Apr-2021 12:16:56 LAD Jose Cruz

## 2021-04-19 NOTE — BRIEF OPERATIVE NOTE - COMMENTS
No qualified resident was available to assist in this case. I have personally first assisted the Cardiothoracic Surgeon listed in this brief op note throughout the entirety of this case.   unquantifiable blood loss due to utilization of cell saver   to CTICU on levophed and insulin  Greater saphenous vein was endoscopically harvested from the right lower extremity.

## 2021-04-19 NOTE — AIRWAY REMOVAL NOTE  ADULT & PEDS - ARTIFICAL AIRWAY REMOVAL COMMENTS
Pt ABG done on CPAP 5/5 40% - Pt extuabted without complications - No distress noted. Pt placed on 40% CAM will continue to monitor patient.

## 2021-04-19 NOTE — CONSULT NOTE ADULT - ASSESSMENT
76F hx HTN, HLD, DM, s/p LHC at [facility] subsequent to MI 3/23/2021 which showed multivessel CAD; Echo 3/23/2021 [facility] LVEF 40;  pt inially declined surgery and sought second opinion. Pt was admitted to [facility] for CABG with Dr Valenzuela. She is now s/p CABG 4/19 LIMA to LAD, SVG to RPDA, SVG to DIAG and LAD endarterectomy. Pt is seen in the SICU post-op and  currently intubated sedated.    CAD s/p CABG 4/19  SCM, LVEF 40%  Intubated/sedated  Cont vent support  Continue inotropes  Cont ASA   76F hx HTN, HLD, DM, s/p LHC at [facility] subsequent to MI 3/23/2021 which showed multivessel CAD; Echo 3/23/2021 [facility] LVEF 40;  pt inially declined surgery and sought second opinion. Pt was admitted to [facility] for CABG with Dr Valenzuela. She is now s/p CABG 4/19 LIMA to LAD, SVG to RPDA, SVG to DIAG and LAD endarterectomy. Pt is seen in the SICU post-op and  currently intubated sedated.    CAD s/p CABG 4/19  SCM, LVEF 40%  Intubated/sedated  Cont vent support  Continue inotropes  Cont ASA    I called Leisa (pt's son)_and updated him about the current status.  I answered his questions.

## 2021-04-20 DIAGNOSIS — I50.9 HEART FAILURE, UNSPECIFIED: ICD-10-CM

## 2021-04-20 DIAGNOSIS — E78.5 HYPERLIPIDEMIA, UNSPECIFIED: ICD-10-CM

## 2021-04-20 DIAGNOSIS — I25.10 ATHEROSCLEROTIC HEART DISEASE OF NATIVE CORONARY ARTERY WITHOUT ANGINA PECTORIS: ICD-10-CM

## 2021-04-20 DIAGNOSIS — F03.90 UNSPECIFIED DEMENTIA WITHOUT BEHAVIORAL DISTURBANCE: ICD-10-CM

## 2021-04-20 LAB
ANION GAP SERPL CALC-SCNC: 12 MMOL/L — SIGNIFICANT CHANGE UP (ref 5–17)
BUN SERPL-MCNC: 18 MG/DL — SIGNIFICANT CHANGE UP (ref 8–20)
CALCIUM SERPL-MCNC: 8.8 MG/DL — SIGNIFICANT CHANGE UP (ref 8.6–10.2)
CHLORIDE SERPL-SCNC: 109 MMOL/L — HIGH (ref 98–107)
CO2 SERPL-SCNC: 24 MMOL/L — SIGNIFICANT CHANGE UP (ref 22–29)
COVID-19 SPIKE DOMAIN AB INTERP: POSITIVE
COVID-19 SPIKE DOMAIN ANTIBODY RESULT: 9.39 U/ML — HIGH
CREAT SERPL-MCNC: 0.76 MG/DL — SIGNIFICANT CHANGE UP (ref 0.5–1.3)
GLUCOSE BLDC GLUCOMTR-MCNC: 100 MG/DL — HIGH (ref 70–99)
GLUCOSE BLDC GLUCOMTR-MCNC: 105 MG/DL — HIGH (ref 70–99)
GLUCOSE BLDC GLUCOMTR-MCNC: 116 MG/DL — HIGH (ref 70–99)
GLUCOSE BLDC GLUCOMTR-MCNC: 118 MG/DL — HIGH (ref 70–99)
GLUCOSE BLDC GLUCOMTR-MCNC: 122 MG/DL — HIGH (ref 70–99)
GLUCOSE BLDC GLUCOMTR-MCNC: 125 MG/DL — HIGH (ref 70–99)
GLUCOSE BLDC GLUCOMTR-MCNC: 126 MG/DL — HIGH (ref 70–99)
GLUCOSE BLDC GLUCOMTR-MCNC: 137 MG/DL — HIGH (ref 70–99)
GLUCOSE BLDC GLUCOMTR-MCNC: 147 MG/DL — HIGH (ref 70–99)
GLUCOSE BLDC GLUCOMTR-MCNC: 148 MG/DL — HIGH (ref 70–99)
GLUCOSE BLDC GLUCOMTR-MCNC: 152 MG/DL — HIGH (ref 70–99)
GLUCOSE SERPL-MCNC: 127 MG/DL — HIGH (ref 70–99)
HCT VFR BLD CALC: 30.6 % — LOW (ref 34.5–45)
HGB BLD-MCNC: 10.1 G/DL — LOW (ref 11.5–15.5)
MAGNESIUM SERPL-MCNC: 2.3 MG/DL — SIGNIFICANT CHANGE UP (ref 1.8–2.6)
MCHC RBC-ENTMCNC: 27.6 PG — SIGNIFICANT CHANGE UP (ref 27–34)
MCHC RBC-ENTMCNC: 33 GM/DL — SIGNIFICANT CHANGE UP (ref 32–36)
MCV RBC AUTO: 83.6 FL — SIGNIFICANT CHANGE UP (ref 80–100)
PLATELET # BLD AUTO: 133 K/UL — LOW (ref 150–400)
POTASSIUM SERPL-MCNC: 4.5 MMOL/L — SIGNIFICANT CHANGE UP (ref 3.5–5.3)
POTASSIUM SERPL-SCNC: 4.5 MMOL/L — SIGNIFICANT CHANGE UP (ref 3.5–5.3)
RBC # BLD: 3.66 M/UL — LOW (ref 3.8–5.2)
RBC # FLD: 15.5 % — HIGH (ref 10.3–14.5)
SARS-COV-2 IGG+IGM SERPL QL IA: 9.39 U/ML — HIGH
SARS-COV-2 IGG+IGM SERPL QL IA: POSITIVE
SODIUM SERPL-SCNC: 144 MMOL/L — SIGNIFICANT CHANGE UP (ref 135–145)
TROPONIN T SERPL-MCNC: 0.4 NG/ML — HIGH (ref 0–0.06)
WBC # BLD: 14.54 K/UL — HIGH (ref 3.8–10.5)
WBC # FLD AUTO: 14.54 K/UL — HIGH (ref 3.8–10.5)

## 2021-04-20 PROCEDURE — 99222 1ST HOSP IP/OBS MODERATE 55: CPT

## 2021-04-20 PROCEDURE — 99232 SBSQ HOSP IP/OBS MODERATE 35: CPT

## 2021-04-20 PROCEDURE — 71045 X-RAY EXAM CHEST 1 VIEW: CPT | Mod: 26

## 2021-04-20 PROCEDURE — 99232 SBSQ HOSP IP/OBS MODERATE 35: CPT | Mod: 24

## 2021-04-20 PROCEDURE — 93010 ELECTROCARDIOGRAM REPORT: CPT

## 2021-04-20 RX ORDER — DEXTROSE 50 % IN WATER 50 %
15 SYRINGE (ML) INTRAVENOUS ONCE
Refills: 0 | Status: DISCONTINUED | OUTPATIENT
Start: 2021-04-20 | End: 2021-04-20

## 2021-04-20 RX ORDER — INSULIN LISPRO 100/ML
VIAL (ML) SUBCUTANEOUS
Refills: 0 | Status: DISCONTINUED | OUTPATIENT
Start: 2021-04-20 | End: 2021-04-26

## 2021-04-20 RX ORDER — DEXTROSE 50 % IN WATER 50 %
25 SYRINGE (ML) INTRAVENOUS ONCE
Refills: 0 | Status: DISCONTINUED | OUTPATIENT
Start: 2021-04-20 | End: 2021-04-20

## 2021-04-20 RX ORDER — OXYCODONE HYDROCHLORIDE 5 MG/1
10 TABLET ORAL EVERY 4 HOURS
Refills: 0 | Status: DISCONTINUED | OUTPATIENT
Start: 2021-04-20 | End: 2021-04-25

## 2021-04-20 RX ORDER — DEXTROSE 50 % IN WATER 50 %
12.5 SYRINGE (ML) INTRAVENOUS ONCE
Refills: 0 | Status: DISCONTINUED | OUTPATIENT
Start: 2021-04-20 | End: 2021-04-20

## 2021-04-20 RX ORDER — INSULIN GLARGINE 100 [IU]/ML
12 INJECTION, SOLUTION SUBCUTANEOUS AT BEDTIME
Refills: 0 | Status: DISCONTINUED | OUTPATIENT
Start: 2021-04-20 | End: 2021-04-22

## 2021-04-20 RX ORDER — NICARDIPINE HYDROCHLORIDE 30 MG/1
2.5 CAPSULE, EXTENDED RELEASE ORAL
Qty: 40 | Refills: 0 | Status: DISCONTINUED | OUTPATIENT
Start: 2021-04-20 | End: 2021-04-20

## 2021-04-20 RX ORDER — FUROSEMIDE 40 MG
40 TABLET ORAL ONCE
Refills: 0 | Status: COMPLETED | OUTPATIENT
Start: 2021-04-20 | End: 2021-04-20

## 2021-04-20 RX ORDER — ENOXAPARIN SODIUM 100 MG/ML
40 INJECTION SUBCUTANEOUS DAILY
Refills: 0 | Status: DISCONTINUED | OUTPATIENT
Start: 2021-04-20 | End: 2021-04-25

## 2021-04-20 RX ORDER — ACETAMINOPHEN 500 MG
1000 TABLET ORAL ONCE
Refills: 0 | Status: COMPLETED | OUTPATIENT
Start: 2021-04-20 | End: 2021-04-20

## 2021-04-20 RX ORDER — INSULIN LISPRO 100/ML
2 VIAL (ML) SUBCUTANEOUS
Refills: 0 | Status: DISCONTINUED | OUTPATIENT
Start: 2021-04-20 | End: 2021-04-20

## 2021-04-20 RX ORDER — ALBUMIN HUMAN 25 %
250 VIAL (ML) INTRAVENOUS ONCE
Refills: 0 | Status: COMPLETED | OUTPATIENT
Start: 2021-04-20 | End: 2021-04-20

## 2021-04-20 RX ORDER — INSULIN LISPRO 100/ML
2 VIAL (ML) SUBCUTANEOUS
Refills: 0 | Status: DISCONTINUED | OUTPATIENT
Start: 2021-04-20 | End: 2021-04-21

## 2021-04-20 RX ORDER — FENTANYL CITRATE 50 UG/ML
25 INJECTION INTRAVENOUS ONCE
Refills: 0 | Status: DISCONTINUED | OUTPATIENT
Start: 2021-04-20 | End: 2021-04-20

## 2021-04-20 RX ORDER — ATORVASTATIN CALCIUM 80 MG/1
80 TABLET, FILM COATED ORAL AT BEDTIME
Refills: 0 | Status: DISCONTINUED | OUTPATIENT
Start: 2021-04-20 | End: 2021-04-26

## 2021-04-20 RX ORDER — GLUCAGON INJECTION, SOLUTION 0.5 MG/.1ML
1 INJECTION, SOLUTION SUBCUTANEOUS ONCE
Refills: 0 | Status: DISCONTINUED | OUTPATIENT
Start: 2021-04-20 | End: 2021-04-25

## 2021-04-20 RX ADMIN — Medication 100 MILLIGRAM(S): at 09:30

## 2021-04-20 RX ADMIN — Medication 125 MILLILITER(S): at 16:07

## 2021-04-20 RX ADMIN — Medication 100 MILLIGRAM(S): at 00:49

## 2021-04-20 RX ADMIN — ATORVASTATIN CALCIUM 80 MILLIGRAM(S): 80 TABLET, FILM COATED ORAL at 21:02

## 2021-04-20 RX ADMIN — Medication 250 MILLIGRAM(S): at 20:41

## 2021-04-20 RX ADMIN — CLOPIDOGREL BISULFATE 75 MILLIGRAM(S): 75 TABLET, FILM COATED ORAL at 11:47

## 2021-04-20 RX ADMIN — Medication 650 MILLIGRAM(S): at 12:13

## 2021-04-20 RX ADMIN — OXYCODONE HYDROCHLORIDE 10 MILLIGRAM(S): 5 TABLET ORAL at 08:04

## 2021-04-20 RX ADMIN — OXYCODONE HYDROCHLORIDE 10 MILLIGRAM(S): 5 TABLET ORAL at 16:36

## 2021-04-20 RX ADMIN — Medication 1000 MILLIGRAM(S): at 07:17

## 2021-04-20 RX ADMIN — Medication 400 MILLIGRAM(S): at 07:15

## 2021-04-20 RX ADMIN — PANTOPRAZOLE SODIUM 40 MILLIGRAM(S): 20 TABLET, DELAYED RELEASE ORAL at 11:44

## 2021-04-20 RX ADMIN — Medication 25 MILLIGRAM(S): at 08:03

## 2021-04-20 RX ADMIN — OXYCODONE HYDROCHLORIDE 10 MILLIGRAM(S): 5 TABLET ORAL at 21:00

## 2021-04-20 RX ADMIN — OXYCODONE HYDROCHLORIDE 10 MILLIGRAM(S): 5 TABLET ORAL at 01:30

## 2021-04-20 RX ADMIN — OXYCODONE HYDROCHLORIDE 10 MILLIGRAM(S): 5 TABLET ORAL at 20:55

## 2021-04-20 RX ADMIN — Medication 250 MILLIGRAM(S): at 09:43

## 2021-04-20 RX ADMIN — CHLORHEXIDINE GLUCONATE 5 MILLILITER(S): 213 SOLUTION TOPICAL at 06:32

## 2021-04-20 RX ADMIN — OXYCODONE HYDROCHLORIDE 10 MILLIGRAM(S): 5 TABLET ORAL at 17:06

## 2021-04-20 RX ADMIN — FENTANYL CITRATE 25 MICROGRAM(S): 50 INJECTION INTRAVENOUS at 04:42

## 2021-04-20 RX ADMIN — Medication 650 MILLIGRAM(S): at 11:43

## 2021-04-20 RX ADMIN — Medication 25 MILLIGRAM(S): at 18:42

## 2021-04-20 RX ADMIN — Medication 40 MILLIGRAM(S): at 22:41

## 2021-04-20 RX ADMIN — ENOXAPARIN SODIUM 40 MILLIGRAM(S): 100 INJECTION SUBCUTANEOUS at 11:47

## 2021-04-20 RX ADMIN — FENTANYL CITRATE 25 MICROGRAM(S): 50 INJECTION INTRAVENOUS at 04:12

## 2021-04-20 RX ADMIN — OXYCODONE HYDROCHLORIDE 10 MILLIGRAM(S): 5 TABLET ORAL at 08:34

## 2021-04-20 RX ADMIN — Medication 100 MILLIGRAM(S): at 18:42

## 2021-04-20 RX ADMIN — Medication 2 UNIT(S): at 11:44

## 2021-04-20 RX ADMIN — INSULIN GLARGINE 12 UNIT(S): 100 INJECTION, SOLUTION SUBCUTANEOUS at 18:52

## 2021-04-20 RX ADMIN — OXYCODONE HYDROCHLORIDE 10 MILLIGRAM(S): 5 TABLET ORAL at 00:49

## 2021-04-20 RX ADMIN — Medication 325 MILLIGRAM(S): at 11:43

## 2021-04-20 NOTE — PHYSICAL THERAPY INITIAL EVALUATION ADULT - GAIT DISTANCE, PT EVAL
x 2; pt reports chest pain with ambulation but unable to quantify amount. pt requesting to return back to room due to feeling nauseous. pt able to ambulate back to room safely. pt vomited once returned to the chair. pt denies SOB, lightheadedness and dizziness after vomiting, COLBY Mariee provided medication for nausea. Vitals stable./25 feet x 2; pt reports sternal pain with ambulation but unable to quantify amount. pt requesting to return back to room due to feeling nauseous. pt able to ambulate back to room safely. pt vomited once returned to the chair. pt denies SOB, lightheadedness and dizziness after vomiting, COLBY Mariee provided medication for nausea. Vitals stable./25 feet

## 2021-04-20 NOTE — PHYSICAL THERAPY INITIAL EVALUATION ADULT - ACTIVE RANGE OF MOTION EXAMINATION, REHAB EVAL
maintaining sternal precautions./bilateral upper extremity Active ROM was WFL (within functional limits)/bilateral  lower extremity Active ROM was WFL (within functional limits)

## 2021-04-20 NOTE — CONSULT NOTE ADULT - ASSESSMENT
76 year old female with PMH of type 2 DM, CAD, CHF, dementia, HLD admitted with multivessel CAD s/p 3V CABG and LAD endarterectomy on 4/19.  His glycemic control is reasonable on insulin infusion in the postoperative setting, however her outpatient control is suboptimal.     1.  Type 2 DM- would continue insulin gtt as per ICU protocol.  When patient is ready for transition to subcutaneous insulin, would follow protocol (based on current requirements would estimate about Lantus 15 units daily and Lispro 5 units with meals).  Will need insulin on discharge,    2.  CAD-  s/p CABG.  Management as per CT surgery team.  Continue ASA, statin and metoprolol.  3.  HLD-  continue high dose statin therapy.

## 2021-04-20 NOTE — PHYSICAL THERAPY INITIAL EVALUATION ADULT - LEVEL OF INDEPENDENCE: SUPINE/SIT, REHAB EVAL
pt found and left OOB in chair with all lines intact, CBWR and son present at bedside./unable to perform

## 2021-04-20 NOTE — PHYSICAL THERAPY INITIAL EVALUATION ADULT - PHYSICAL ASSIST/NONPHYSICAL ASSIST: SIT/STAND, REHAB EVAL
pt required verbal and tactile cues to encourage pt to push off using LE and to use arm rests to assist./supervision/nonverbal cues (demo/gestures)/1 person assist

## 2021-04-20 NOTE — PHYSICAL THERAPY INITIAL EVALUATION ADULT - GENERAL OBSERVATIONS, REHAB EVAL
pt received OOB in chair + orellana + IV + chest tube x 2 + pacemaker box + VCBs + right IJ line + right radial a-line, Urdu speaking, Son present at bedside and able to translate, A&OX3, NAD and willing to participate with PT.

## 2021-04-20 NOTE — PHYSICAL THERAPY INITIAL EVALUATION ADULT - ADDITIONAL COMMENTS
Son present at bedside and able to provide social/functional history. As per son pt lives in a high ranch with 6 steps to enter with no handrail + 6 steps upstairs to bedroom, kitchen and bathroom with one handrail. Son reports he lives downstairs with his wife and children and the pt lives upstairs. pt's son reports she was independent with all ADLs and no assistive device needed. pt does not drive. No DME at home. As per son pt will not be alone, daughter-in-law is able to assist.

## 2021-04-20 NOTE — PHYSICAL THERAPY INITIAL EVALUATION ADULT - PHYSICAL ASSIST/NONPHYSICAL ASSIST: STAND/SIT, REHAB EVAL
verbal and tactile cues required to maintain safety and increase safety awareness; pt required verbal cues to step back until both LE touch chair before sitting down./nonverbal cues (demo/gestures)/1 person assist

## 2021-04-20 NOTE — PHYSICAL THERAPY INITIAL EVALUATION ADULT - BALANCE DISTURBANCE, IDENTIFIED IMPAIRMENT CONTRIBUTE, REHAB EVAL
I did it online.     I printed the Eligibility Certification Statement that can be mailed to her in case she needs proof it was done.      Here's the text of that statement:    This statement is for issuance of a Permanent Disabled Parking Identification (DIS ID) permit and is not to be considered as a claim for VA benefits.      Eligibility Certification Statement - I, Eliecer Abdalla, certify the applicant identified above has a permanent qualifying disability authorized on 09-.      The above applicant's record has been updated. They will receive their disabled parking renewal stickers within 7 to 10 business days. Eliecer Abdalla 09-   pain/decreased strength

## 2021-04-21 ENCOUNTER — NON-APPOINTMENT (OUTPATIENT)
Age: 76
End: 2021-04-21

## 2021-04-21 ENCOUNTER — TRANSCRIPTION ENCOUNTER (OUTPATIENT)
Age: 76
End: 2021-04-21

## 2021-04-21 DIAGNOSIS — Z29.9 ENCOUNTER FOR PROPHYLACTIC MEASURES, UNSPECIFIED: ICD-10-CM

## 2021-04-21 LAB
ANION GAP SERPL CALC-SCNC: 15 MMOL/L — SIGNIFICANT CHANGE UP (ref 5–17)
BUN SERPL-MCNC: 24 MG/DL — HIGH (ref 8–20)
CALCIUM SERPL-MCNC: 9 MG/DL — SIGNIFICANT CHANGE UP (ref 8.6–10.2)
CHLORIDE SERPL-SCNC: 101 MMOL/L — SIGNIFICANT CHANGE UP (ref 98–107)
CO2 SERPL-SCNC: 26 MMOL/L — SIGNIFICANT CHANGE UP (ref 22–29)
CREAT SERPL-MCNC: 0.88 MG/DL — SIGNIFICANT CHANGE UP (ref 0.5–1.3)
GLUCOSE BLDC GLUCOMTR-MCNC: 181 MG/DL — HIGH (ref 70–99)
GLUCOSE BLDC GLUCOMTR-MCNC: 205 MG/DL — HIGH (ref 70–99)
GLUCOSE BLDC GLUCOMTR-MCNC: 234 MG/DL — HIGH (ref 70–99)
GLUCOSE BLDC GLUCOMTR-MCNC: 92 MG/DL — SIGNIFICANT CHANGE UP (ref 70–99)
GLUCOSE SERPL-MCNC: 173 MG/DL — HIGH (ref 70–99)
HCT VFR BLD CALC: 32.1 % — LOW (ref 34.5–45)
HGB BLD-MCNC: 10 G/DL — LOW (ref 11.5–15.5)
MAGNESIUM SERPL-MCNC: 2.2 MG/DL — SIGNIFICANT CHANGE UP (ref 1.6–2.6)
MCHC RBC-ENTMCNC: 27 PG — SIGNIFICANT CHANGE UP (ref 27–34)
MCHC RBC-ENTMCNC: 31.2 GM/DL — LOW (ref 32–36)
MCV RBC AUTO: 86.8 FL — SIGNIFICANT CHANGE UP (ref 80–100)
PLATELET # BLD AUTO: 150 K/UL — SIGNIFICANT CHANGE UP (ref 150–400)
POTASSIUM SERPL-MCNC: 4.4 MMOL/L — SIGNIFICANT CHANGE UP (ref 3.5–5.3)
POTASSIUM SERPL-SCNC: 4.4 MMOL/L — SIGNIFICANT CHANGE UP (ref 3.5–5.3)
RBC # BLD: 3.7 M/UL — LOW (ref 3.8–5.2)
RBC # FLD: 16.7 % — HIGH (ref 10.3–14.5)
SODIUM SERPL-SCNC: 142 MMOL/L — SIGNIFICANT CHANGE UP (ref 135–145)
TROPONIN T SERPL-MCNC: 0.3 NG/ML — HIGH (ref 0–0.06)
WBC # BLD: 17.56 K/UL — HIGH (ref 3.8–10.5)
WBC # FLD AUTO: 17.56 K/UL — HIGH (ref 3.8–10.5)

## 2021-04-21 PROCEDURE — 71045 X-RAY EXAM CHEST 1 VIEW: CPT | Mod: 26,77

## 2021-04-21 PROCEDURE — 99232 SBSQ HOSP IP/OBS MODERATE 35: CPT

## 2021-04-21 PROCEDURE — 99024 POSTOP FOLLOW-UP VISIT: CPT

## 2021-04-21 PROCEDURE — 71045 X-RAY EXAM CHEST 1 VIEW: CPT | Mod: 26

## 2021-04-21 PROCEDURE — 93010 ELECTROCARDIOGRAM REPORT: CPT

## 2021-04-21 RX ORDER — INSULIN LISPRO 100/ML
4 VIAL (ML) SUBCUTANEOUS
Refills: 0 | Status: DISCONTINUED | OUTPATIENT
Start: 2021-04-21 | End: 2021-04-26

## 2021-04-21 RX ORDER — SODIUM CHLORIDE 9 MG/ML
3 INJECTION INTRAMUSCULAR; INTRAVENOUS; SUBCUTANEOUS EVERY 8 HOURS
Refills: 0 | Status: DISCONTINUED | OUTPATIENT
Start: 2021-04-21 | End: 2021-04-26

## 2021-04-21 RX ADMIN — Medication 25 MILLIGRAM(S): at 06:16

## 2021-04-21 RX ADMIN — SODIUM CHLORIDE 3 MILLILITER(S): 9 INJECTION INTRAMUSCULAR; INTRAVENOUS; SUBCUTANEOUS at 13:45

## 2021-04-21 RX ADMIN — OXYCODONE HYDROCHLORIDE 10 MILLIGRAM(S): 5 TABLET ORAL at 04:35

## 2021-04-21 RX ADMIN — CLOPIDOGREL BISULFATE 75 MILLIGRAM(S): 75 TABLET, FILM COATED ORAL at 12:23

## 2021-04-21 RX ADMIN — ENOXAPARIN SODIUM 40 MILLIGRAM(S): 100 INJECTION SUBCUTANEOUS at 12:24

## 2021-04-21 RX ADMIN — CHLORHEXIDINE GLUCONATE 5 MILLILITER(S): 213 SOLUTION TOPICAL at 06:16

## 2021-04-21 RX ADMIN — INSULIN GLARGINE 12 UNIT(S): 100 INJECTION, SOLUTION SUBCUTANEOUS at 21:13

## 2021-04-21 RX ADMIN — OXYCODONE HYDROCHLORIDE 10 MILLIGRAM(S): 5 TABLET ORAL at 06:15

## 2021-04-21 RX ADMIN — Medication 2: at 06:14

## 2021-04-21 RX ADMIN — Medication 4: at 17:56

## 2021-04-21 RX ADMIN — Medication 100 MILLIGRAM(S): at 01:25

## 2021-04-21 RX ADMIN — PANTOPRAZOLE SODIUM 40 MILLIGRAM(S): 20 TABLET, DELAYED RELEASE ORAL at 12:24

## 2021-04-21 RX ADMIN — CHLORHEXIDINE GLUCONATE 5 MILLILITER(S): 213 SOLUTION TOPICAL at 18:02

## 2021-04-21 RX ADMIN — Medication 325 MILLIGRAM(S): at 12:23

## 2021-04-21 RX ADMIN — ATORVASTATIN CALCIUM 80 MILLIGRAM(S): 80 TABLET, FILM COATED ORAL at 21:13

## 2021-04-21 RX ADMIN — OXYCODONE HYDROCHLORIDE 10 MILLIGRAM(S): 5 TABLET ORAL at 01:25

## 2021-04-21 RX ADMIN — Medication 25 MILLIGRAM(S): at 17:56

## 2021-04-21 RX ADMIN — Medication 100 MILLIGRAM(S): at 12:19

## 2021-04-21 RX ADMIN — Medication 2 UNIT(S): at 06:14

## 2021-04-21 RX ADMIN — Medication 4 UNIT(S): at 17:56

## 2021-04-21 RX ADMIN — Medication 650 MILLIGRAM(S): at 12:26

## 2021-04-21 RX ADMIN — Medication 250 MILLIGRAM(S): at 08:30

## 2021-04-21 NOTE — DISCHARGE NOTE NURSING/CASE MANAGEMENT/SOCIAL WORK - PATIENT PORTAL LINK FT
You can access the FollowMyHealth Patient Portal offered by [facility] by registering at the following website: [email or web address]. By joining [facility]’s FollowMyHealth portal, you will also be able to view your health information using other applications (apps) compatible with our system.

## 2021-04-21 NOTE — CHART NOTE - NSCHARTNOTEFT_GEN_A_CORE
Received patient from [facility] earlier this afternoon.  Used  Ipad in [place] to speak with pt.  Mediastinal tubes removed and dry sterile dressing placed.  Plan to remove left chest tube today as well.  Pt denies CP or SOB.  NAD noted.  VSS.  PW noted to [facility] EPM backup.

## 2021-04-21 NOTE — DIETITIAN INITIAL EVALUATION ADULT. - PERTINENT LABORATORY DATA
04-21 Na142 mmol/L Glu 173 mg/dL<H> K+ 4.4 mmol/L Cr  0.88 mg/dL BUN 24.0 mg/dL<H> Phos n/a   Alb n/a   PAB n/a     n/a  HgbA1C

## 2021-04-21 NOTE — DIETITIAN INITIAL EVALUATION ADULT. - OTHER INFO
Pt is a 75 y/o female presents to PST today. Patient reports a history of heart disease for about 20 years. Reports that she was told about a month ago that she had a myocardial infarction. As per son, Patient has about 3-4 nblocked vessels in the heart. Patient c/o chest pain  radiating through right upper back. Denies any fever, SOB, Chills. Pt is now C3L with LAD endarterectomy. Pt with mild dementia; asleep during visit. Spoke with RN at bedside, reports pt taking and tolerating diet well without difficulty chewing or swallowing. RN reports pt's son has been visiting daily and bringing in food from home which pt likes and eats.

## 2021-04-21 NOTE — DISCHARGE NOTE NURSING/CASE MANAGEMENT/SOCIAL WORK - NSDCCRTYPESERV_GEN_ALL_CORE_FT
[facility] referral [phone] to start process for aide services

## 2021-04-22 DIAGNOSIS — I48.91 UNSPECIFIED ATRIAL FIBRILLATION: ICD-10-CM

## 2021-04-22 LAB
ANION GAP SERPL CALC-SCNC: 13 MMOL/L — SIGNIFICANT CHANGE UP (ref 5–17)
ANION GAP SERPL CALC-SCNC: 14 MMOL/L — SIGNIFICANT CHANGE UP (ref 5–17)
BUN SERPL-MCNC: 37 MG/DL — HIGH (ref 8–20)
BUN SERPL-MCNC: 38 MG/DL — HIGH (ref 8–20)
CALCIUM SERPL-MCNC: 8.7 MG/DL — SIGNIFICANT CHANGE UP (ref 8.6–10.2)
CALCIUM SERPL-MCNC: 8.7 MG/DL — SIGNIFICANT CHANGE UP (ref 8.6–10.2)
CHLORIDE SERPL-SCNC: 102 MMOL/L — SIGNIFICANT CHANGE UP (ref 98–107)
CHLORIDE SERPL-SCNC: 102 MMOL/L — SIGNIFICANT CHANGE UP (ref 98–107)
CO2 SERPL-SCNC: 26 MMOL/L — SIGNIFICANT CHANGE UP (ref 22–29)
CO2 SERPL-SCNC: 27 MMOL/L — SIGNIFICANT CHANGE UP (ref 22–29)
CREAT SERPL-MCNC: 0.88 MG/DL — SIGNIFICANT CHANGE UP (ref 0.5–1.3)
CREAT SERPL-MCNC: 0.92 MG/DL — SIGNIFICANT CHANGE UP (ref 0.5–1.3)
GLUCOSE BLDC GLUCOMTR-MCNC: 133 MG/DL — HIGH (ref 70–99)
GLUCOSE BLDC GLUCOMTR-MCNC: 176 MG/DL — HIGH (ref 70–99)
GLUCOSE BLDC GLUCOMTR-MCNC: 177 MG/DL — HIGH (ref 70–99)
GLUCOSE BLDC GLUCOMTR-MCNC: 223 MG/DL — HIGH (ref 70–99)
GLUCOSE SERPL-MCNC: 156 MG/DL — HIGH (ref 70–99)
GLUCOSE SERPL-MCNC: 199 MG/DL — HIGH (ref 70–99)
HCT VFR BLD CALC: 30 % — LOW (ref 34.5–45)
HCT VFR BLD CALC: 30.5 % — LOW (ref 34.5–45)
HGB BLD-MCNC: 9.5 G/DL — LOW (ref 11.5–15.5)
HGB BLD-MCNC: 9.7 G/DL — LOW (ref 11.5–15.5)
MAGNESIUM SERPL-MCNC: 2.3 MG/DL — SIGNIFICANT CHANGE UP (ref 1.8–2.6)
MAGNESIUM SERPL-MCNC: 2.3 MG/DL — SIGNIFICANT CHANGE UP (ref 1.8–2.6)
MCHC RBC-ENTMCNC: 27.1 PG — SIGNIFICANT CHANGE UP (ref 27–34)
MCHC RBC-ENTMCNC: 27.5 PG — SIGNIFICANT CHANGE UP (ref 27–34)
MCHC RBC-ENTMCNC: 31.1 GM/DL — LOW (ref 32–36)
MCHC RBC-ENTMCNC: 32.3 GM/DL — SIGNIFICANT CHANGE UP (ref 32–36)
MCV RBC AUTO: 85 FL — SIGNIFICANT CHANGE UP (ref 80–100)
MCV RBC AUTO: 87.1 FL — SIGNIFICANT CHANGE UP (ref 80–100)
PLATELET # BLD AUTO: 160 K/UL — SIGNIFICANT CHANGE UP (ref 150–400)
PLATELET # BLD AUTO: 164 K/UL — SIGNIFICANT CHANGE UP (ref 150–400)
POTASSIUM SERPL-MCNC: 4 MMOL/L — SIGNIFICANT CHANGE UP (ref 3.5–5.3)
POTASSIUM SERPL-MCNC: 4.1 MMOL/L — SIGNIFICANT CHANGE UP (ref 3.5–5.3)
POTASSIUM SERPL-SCNC: 4 MMOL/L — SIGNIFICANT CHANGE UP (ref 3.5–5.3)
POTASSIUM SERPL-SCNC: 4.1 MMOL/L — SIGNIFICANT CHANGE UP (ref 3.5–5.3)
RBC # BLD: 3.5 M/UL — LOW (ref 3.8–5.2)
RBC # BLD: 3.53 M/UL — LOW (ref 3.8–5.2)
RBC # FLD: 16.9 % — HIGH (ref 10.3–14.5)
RBC # FLD: 17 % — HIGH (ref 10.3–14.5)
SODIUM SERPL-SCNC: 142 MMOL/L — SIGNIFICANT CHANGE UP (ref 135–145)
SODIUM SERPL-SCNC: 142 MMOL/L — SIGNIFICANT CHANGE UP (ref 135–145)
TROPONIN T SERPL-MCNC: 0.27 NG/ML — HIGH (ref 0–0.06)
WBC # BLD: 13.8 K/UL — HIGH (ref 3.8–10.5)
WBC # BLD: 14.36 K/UL — HIGH (ref 3.8–10.5)
WBC # FLD AUTO: 13.8 K/UL — HIGH (ref 3.8–10.5)
WBC # FLD AUTO: 14.36 K/UL — HIGH (ref 3.8–10.5)

## 2021-04-22 PROCEDURE — 93010 ELECTROCARDIOGRAM REPORT: CPT | Mod: 76

## 2021-04-22 PROCEDURE — 71045 X-RAY EXAM CHEST 1 VIEW: CPT | Mod: 26,77

## 2021-04-22 PROCEDURE — 71045 X-RAY EXAM CHEST 1 VIEW: CPT | Mod: 26

## 2021-04-22 PROCEDURE — 99232 SBSQ HOSP IP/OBS MODERATE 35: CPT

## 2021-04-22 RX ORDER — AMIODARONE HYDROCHLORIDE 400 MG/1
TABLET ORAL
Refills: 0 | Status: DISCONTINUED | OUTPATIENT
Start: 2021-04-22 | End: 2021-04-23

## 2021-04-22 RX ORDER — AMIODARONE HYDROCHLORIDE 400 MG/1
0.5 TABLET ORAL
Qty: 900 | Refills: 0 | Status: DISCONTINUED | OUTPATIENT
Start: 2021-04-22 | End: 2021-04-22

## 2021-04-22 RX ORDER — AMIODARONE HYDROCHLORIDE 400 MG/1
150 TABLET ORAL ONCE
Refills: 0 | Status: COMPLETED | OUTPATIENT
Start: 2021-04-22 | End: 2021-04-22

## 2021-04-22 RX ORDER — AMIODARONE HYDROCHLORIDE 400 MG/1
400 TABLET ORAL EVERY 8 HOURS
Refills: 0 | Status: DISCONTINUED | OUTPATIENT
Start: 2021-04-22 | End: 2021-04-23

## 2021-04-22 RX ORDER — INSULIN GLARGINE 100 [IU]/ML
16 INJECTION, SOLUTION SUBCUTANEOUS AT BEDTIME
Refills: 0 | Status: DISCONTINUED | OUTPATIENT
Start: 2021-04-22 | End: 2021-04-25

## 2021-04-22 RX ADMIN — SODIUM CHLORIDE 3 MILLILITER(S): 9 INJECTION INTRAMUSCULAR; INTRAVENOUS; SUBCUTANEOUS at 01:52

## 2021-04-22 RX ADMIN — AMIODARONE HYDROCHLORIDE 33.3 MG/MIN: 400 TABLET ORAL at 02:50

## 2021-04-22 RX ADMIN — ENOXAPARIN SODIUM 40 MILLIGRAM(S): 100 INJECTION SUBCUTANEOUS at 21:30

## 2021-04-22 RX ADMIN — Medication 4: at 08:31

## 2021-04-22 RX ADMIN — SODIUM CHLORIDE 3 MILLILITER(S): 9 INJECTION INTRAMUSCULAR; INTRAVENOUS; SUBCUTANEOUS at 12:20

## 2021-04-22 RX ADMIN — Medication 2: at 21:31

## 2021-04-22 RX ADMIN — Medication 25 MILLIGRAM(S): at 17:12

## 2021-04-22 RX ADMIN — INSULIN GLARGINE 16 UNIT(S): 100 INJECTION, SOLUTION SUBCUTANEOUS at 21:30

## 2021-04-22 RX ADMIN — SODIUM CHLORIDE 3 MILLILITER(S): 9 INJECTION INTRAMUSCULAR; INTRAVENOUS; SUBCUTANEOUS at 21:21

## 2021-04-22 RX ADMIN — AMIODARONE HYDROCHLORIDE 400 MILLIGRAM(S): 400 TABLET ORAL at 13:40

## 2021-04-22 RX ADMIN — Medication 4 UNIT(S): at 12:16

## 2021-04-22 RX ADMIN — SODIUM CHLORIDE 3 MILLILITER(S): 9 INJECTION INTRAMUSCULAR; INTRAVENOUS; SUBCUTANEOUS at 06:57

## 2021-04-22 RX ADMIN — CLOPIDOGREL BISULFATE 75 MILLIGRAM(S): 75 TABLET, FILM COATED ORAL at 12:16

## 2021-04-22 RX ADMIN — AMIODARONE HYDROCHLORIDE 618 MILLIGRAM(S): 400 TABLET ORAL at 01:59

## 2021-04-22 RX ADMIN — Medication 4 UNIT(S): at 17:12

## 2021-04-22 RX ADMIN — PANTOPRAZOLE SODIUM 40 MILLIGRAM(S): 20 TABLET, DELAYED RELEASE ORAL at 12:16

## 2021-04-22 RX ADMIN — Medication 2: at 12:16

## 2021-04-22 RX ADMIN — Medication 25 MILLIGRAM(S): at 05:03

## 2021-04-22 RX ADMIN — ATORVASTATIN CALCIUM 80 MILLIGRAM(S): 80 TABLET, FILM COATED ORAL at 21:31

## 2021-04-22 RX ADMIN — AMIODARONE HYDROCHLORIDE 400 MILLIGRAM(S): 400 TABLET ORAL at 05:03

## 2021-04-22 RX ADMIN — AMIODARONE HYDROCHLORIDE 400 MILLIGRAM(S): 400 TABLET ORAL at 21:31

## 2021-04-22 RX ADMIN — AMIODARONE HYDROCHLORIDE 618 MILLIGRAM(S): 400 TABLET ORAL at 02:20

## 2021-04-22 RX ADMIN — Medication 325 MILLIGRAM(S): at 12:16

## 2021-04-22 RX ADMIN — Medication 4 UNIT(S): at 08:31

## 2021-04-22 NOTE — CHART NOTE - NSCHARTNOTEFT_GEN_A_CORE
CTS ACP Addendum    Briefly, 76 year old female with a medical history of type 2 DM (HA1C 9.4 on insulin at home), CAD, CHF, dementia, HTN, HLD, now s/p CABG X 3 (LIMA to LAD, SVG to RPDA, SVG to DIAG) with LAD endarterectomy and right SVG harvest on 4/19/21 with Dr. Valenzuela. Postoperative course complicated by FIDELINA (requiring amio gtt).     Patient seen and examined. Notes, flowsheets, medications, radiologic images and labs reviewed. Spoke with patient using [employer] video  [employer] #[id number]. Patient in NAD denies cp, sob. Vitals now stable after brief AF with RVR overnight responded well to Amiodarone gtt.     Plan:  - Left chest tube removed, CXR without ptx  - Amiodarone transitioned to oral load  - Labs stable, vitals stable  - OOB, ambulate, chest PT, Incentive Spirometry encouraged  - PW to EPM  - Physical therapy  - ASA and Plavix for CAD  - Plan for d/c home hopefully Saturday    Case discussed with Dr. Valenzuela in Am rounds

## 2021-04-23 LAB
ANION GAP SERPL CALC-SCNC: 12 MMOL/L — SIGNIFICANT CHANGE UP (ref 5–17)
BUN SERPL-MCNC: 40 MG/DL — HIGH (ref 8–20)
CALCIUM SERPL-MCNC: 8.5 MG/DL — LOW (ref 8.6–10.2)
CHLORIDE SERPL-SCNC: 104 MMOL/L — SIGNIFICANT CHANGE UP (ref 98–107)
CO2 SERPL-SCNC: 27 MMOL/L — SIGNIFICANT CHANGE UP (ref 22–29)
CREAT SERPL-MCNC: 0.88 MG/DL — SIGNIFICANT CHANGE UP (ref 0.5–1.3)
GLUCOSE BLDC GLUCOMTR-MCNC: 157 MG/DL — HIGH (ref 70–99)
GLUCOSE BLDC GLUCOMTR-MCNC: 168 MG/DL — HIGH (ref 70–99)
GLUCOSE BLDC GLUCOMTR-MCNC: 170 MG/DL — HIGH (ref 70–99)
GLUCOSE BLDC GLUCOMTR-MCNC: 179 MG/DL — HIGH (ref 70–99)
GLUCOSE BLDC GLUCOMTR-MCNC: 270 MG/DL — HIGH (ref 70–99)
GLUCOSE SERPL-MCNC: 156 MG/DL — HIGH (ref 70–99)
HCT VFR BLD CALC: 28 % — LOW (ref 34.5–45)
HGB BLD-MCNC: 9 G/DL — LOW (ref 11.5–15.5)
MAGNESIUM SERPL-MCNC: 2.3 MG/DL — SIGNIFICANT CHANGE UP (ref 1.6–2.6)
MCHC RBC-ENTMCNC: 28 PG — SIGNIFICANT CHANGE UP (ref 27–34)
MCHC RBC-ENTMCNC: 32.1 GM/DL — SIGNIFICANT CHANGE UP (ref 32–36)
MCV RBC AUTO: 87 FL — SIGNIFICANT CHANGE UP (ref 80–100)
PLATELET # BLD AUTO: 200 K/UL — SIGNIFICANT CHANGE UP (ref 150–400)
POTASSIUM SERPL-MCNC: 3.7 MMOL/L — SIGNIFICANT CHANGE UP (ref 3.5–5.3)
POTASSIUM SERPL-SCNC: 3.7 MMOL/L — SIGNIFICANT CHANGE UP (ref 3.5–5.3)
RBC # BLD: 3.22 M/UL — LOW (ref 3.8–5.2)
RBC # FLD: 16.4 % — HIGH (ref 10.3–14.5)
SODIUM SERPL-SCNC: 143 MMOL/L — SIGNIFICANT CHANGE UP (ref 135–145)
WBC # BLD: 11.94 K/UL — HIGH (ref 3.8–10.5)
WBC # FLD AUTO: 11.94 K/UL — HIGH (ref 3.8–10.5)

## 2021-04-23 PROCEDURE — 99232 SBSQ HOSP IP/OBS MODERATE 35: CPT

## 2021-04-23 PROCEDURE — 93010 ELECTROCARDIOGRAM REPORT: CPT

## 2021-04-23 PROCEDURE — 71045 X-RAY EXAM CHEST 1 VIEW: CPT | Mod: 26

## 2021-04-23 RX ORDER — METOPROLOL TARTRATE 50 MG
12.5 TABLET ORAL
Refills: 0 | Status: DISCONTINUED | OUTPATIENT
Start: 2021-04-23 | End: 2021-04-26

## 2021-04-23 RX ORDER — AMIODARONE HYDROCHLORIDE 400 MG/1
200 TABLET ORAL
Refills: 0 | Status: DISCONTINUED | OUTPATIENT
Start: 2021-04-23 | End: 2021-04-24

## 2021-04-23 RX ADMIN — Medication 6: at 12:39

## 2021-04-23 RX ADMIN — Medication 12.5 MILLIGRAM(S): at 18:18

## 2021-04-23 RX ADMIN — SODIUM CHLORIDE 3 MILLILITER(S): 9 INJECTION INTRAMUSCULAR; INTRAVENOUS; SUBCUTANEOUS at 21:26

## 2021-04-23 RX ADMIN — ATORVASTATIN CALCIUM 80 MILLIGRAM(S): 80 TABLET, FILM COATED ORAL at 21:26

## 2021-04-23 RX ADMIN — CLOPIDOGREL BISULFATE 75 MILLIGRAM(S): 75 TABLET, FILM COATED ORAL at 12:38

## 2021-04-23 RX ADMIN — PANTOPRAZOLE SODIUM 40 MILLIGRAM(S): 20 TABLET, DELAYED RELEASE ORAL at 12:38

## 2021-04-23 RX ADMIN — Medication 325 MILLIGRAM(S): at 12:38

## 2021-04-23 RX ADMIN — AMIODARONE HYDROCHLORIDE 400 MILLIGRAM(S): 400 TABLET ORAL at 05:57

## 2021-04-23 RX ADMIN — Medication 2: at 21:27

## 2021-04-23 RX ADMIN — AMIODARONE HYDROCHLORIDE 200 MILLIGRAM(S): 400 TABLET ORAL at 18:18

## 2021-04-23 RX ADMIN — ENOXAPARIN SODIUM 40 MILLIGRAM(S): 100 INJECTION SUBCUTANEOUS at 12:38

## 2021-04-23 RX ADMIN — INSULIN GLARGINE 16 UNIT(S): 100 INJECTION, SOLUTION SUBCUTANEOUS at 21:26

## 2021-04-23 RX ADMIN — Medication 2: at 18:18

## 2021-04-23 RX ADMIN — Medication 4 UNIT(S): at 18:18

## 2021-04-23 RX ADMIN — Medication 25 MILLIGRAM(S): at 05:57

## 2021-04-23 RX ADMIN — SODIUM CHLORIDE 3 MILLILITER(S): 9 INJECTION INTRAMUSCULAR; INTRAVENOUS; SUBCUTANEOUS at 12:59

## 2021-04-23 RX ADMIN — Medication 4 UNIT(S): at 12:39

## 2021-04-23 RX ADMIN — SODIUM CHLORIDE 3 MILLILITER(S): 9 INJECTION INTRAMUSCULAR; INTRAVENOUS; SUBCUTANEOUS at 05:57

## 2021-04-23 NOTE — CONSULT NOTE ADULT - ASSESSMENT
76 year old female with a history HTN, DM and CAD s/p 3v CABG on 4/19/2021. Post-op course complicated by atrial fibrillation with RVR. Was given IV amiodarone which resulted in conversion to sinus rhythm. Subsequently, noted to have pauses on telemetry resulting in pacing via epicardial wires.     Reviewed telemetry. Pauses appear to be secondary to non-conducted (blocked) PACs. This is not true tachy-tata syndrome.     Recommendations:  - Continue low dose amiodarone 200 mg bid for 3 days, then 200 daily  - Metoprolol 12.5 mg bid  - Above meds can be titrated/adjusted as needed  - Should be on oral anticoagulation when surgical bleeding risk is deemed to be low ( for future stroke prophylaxis, CHADSVASC score 5) given elevated stroke risk  - Continue to monitor on telemetry  - 30 day event monitor at discharge     Thank you for this consult. Please call with questions.   Will follow.   Above discussed with CT surgery team.

## 2021-04-23 NOTE — CHART NOTE - NSCHARTNOTEFT_GEN_A_CORE
76 year old female with a medical history of type 2 DM (HA1C 9.4 on insulin at home), CAD, CHF, dementia, HTN, HLD, now s/p CABG X 3 (LIMA to LAD, SVG to RPDA, SVG to DIAG) with LAD endarterectomy and right SVG harvest on 4/19/21 with Dr. Valenzuela. Postoperative course complicated by FIDELINA (requiring amio gtt, converted back to NSR, now transitioned to PO amio load).   Patient was noted to have episodes of external pacing in the 40s.  with occasional PVCs  EP consulted and reccomeded amio 200 BID, decreased Lopressor to 12.5 BID and anticoagulation.    Will speak to Dr De Jesus about plan

## 2021-04-23 NOTE — CONSULT NOTE ADULT - SUBJECTIVE AND OBJECTIVE BOX
[facility] [place]                                                                                                 ([facility])                                                                                                 CONSULTATION NOTE       History obtained by: Patient, family and medical record     obtained: No    Primary Cardiologist:     Chief complaint:    Patient is a 76y old  Female who presents with a chief complaint of s/p CABG (2021 12:27)      HPI:  76F hx HTN, HLD, DM, s/p LHC at [facility] subsequent to MI 3/23/2021 which showed multivessel CAD; Echo 3/23/2021 [facility] LVEF 40;  pt inially declined surgery and sought second opinion. Pt was admitted to [facility] for CABG with Dr Valenzuela. She is now s/p CABG  LIMA to LAD, SVG to RPDA, SVG to DIAG and LAD endarterectomy. Pt is seen in the SICU post-op and  currently intubated sedated.      REVIEW OF SYMPTOMS:   Intubated/sedated    ALL OTHER REVIEW OF SYSTEMS ARE NEGATIVE.    MEDICATIONS  (STANDING):  aspirin 325 milliGRAM(s) Oral daily  cefuroxime  IVPB 1500 milliGRAM(s) IV Intermittent every 8 hours  chlorhexidine 0.12% Liquid 15 milliLiter(s) Oral Mucosa every 12 hours  chlorhexidine 0.12% Liquid 5 milliLiter(s) Oral Mucosa two times a day  clopidogrel Tablet 75 milliGRAM(s) Oral daily  dextrose 50% Injectable 50 milliLiter(s) IV Push every 15 minutes  dextrose 50% Injectable 25 milliLiter(s) IV Push every 15 minutes  insulin regular Infusion 2 Unit(s)/Hr (2 mL/Hr) IV Continuous <Continuous>  meperidine     Injectable 25 milliGRAM(s) IV Push once  norepinephrine Infusion 0.05 MICROgram(s)/kG/Min (4.8 mL/Hr) IV Continuous <Continuous>  potassium chloride  10 mEq/50 mL IVPB 10 milliEquivalent(s) IV Intermittent every 1 hour  potassium chloride  10 mEq/50 mL IVPB 10 milliEquivalent(s) IV Intermittent every 1 hour  potassium chloride  10 mEq/50 mL IVPB 10 milliEquivalent(s) IV Intermittent every 1 hour  propofol Infusion 5 MICROgram(s)/kG/Min (1.54 mL/Hr) IV Continuous <Continuous>  sodium chloride 0.9%. 1000 milliLiter(s) (10 mL/Hr) IV Continuous <Continuous>  sodium chloride 0.9%. 1000 milliLiter(s) (5 mL/Hr) IV Continuous <Continuous>  vancomycin  IVPB 750 milliGRAM(s) IV Intermittent every 12 hours    MEDICATIONS  (PRN):  fentaNYL    Injectable 50 MICROGram(s) IV Push every 30 minutes PRN Moderate Pain (4 - 6)        PAST MEDICAL & SURGICAL HISTORY:  CAD (coronary artery disease)    Type 2 diabetes mellitus    Hypertension    H/O myocardial ischemia    Hyperlipemia    At risk for sleep apnea  Bang 5    Congestive heart failure    Dementia    H/O:         FAMILY HISTORY:  FH: heart disease (Sibling)    FH: diabetes mellitus (Sibling, Child)      Non-Contributory    SOCIAL HISTORY:    CIGARETTES:  Denies    ALCOHOL: Denies    DRUGS: Denies    Vital Signs Last 24 Hrs  T(C): 36.4 (2021 17:45), Max: 37.2 (2021 05:35)  T(F): 97.5 (2021 17:45), Max: 99 (2021 05:35)  HR: 75 (2021 19:15) (62 - 80)  BP: 138/115 (2021 05:35) (138/115 - 138/115)  BP(mean): --  RR: 12 (2021 19:15) (11 - 19)  SpO2: 100% (2021 19:15) (99% - 100%)    PHYSICAL EXAM:    Constitutional: No fever, chills, NAD, Comfortable    Eyes: Not reddened, no discharge    ENMT: No discharge, No pain    Neck: No JVD,     Back: No CVA tenderness    Respiratory: Clear to auscultation bilaterally    Cardiovascular: RRR, Normal S1-2, No S3-    Gastrointestinal: Soft, NT/ND.No organomegaly    Extremities: No edema    Vascular: Distal pulses intact    Neurological: Intubated/sedated    Skin: No ecchymosis, no rash    Musculoskeletal: Non tender,     Psychiatric: intubated/sedated      INTERPRETATION OF TELEMETRY:    EC/15/2021      Ventricular Rate 57 BPM    Atrial Rate 57 BPM    P-R Interval 140 ms    QRS Duration 74 ms    Q-T Interval 446 ms    QTC Calculation(Bazett) 434 ms    P Axis 52 degrees    R Axis 42 degrees    T Axis -79 degrees    Diagnosis Line Sinus bradycardia  ST & T wave abnormality, consider inferior ischemia  ST & T wave abnormality, consider anterolateral ischemia  Abnormal ECG    Confirmed by NIEVES MONTEMAYOR ([id number]) on 4/15/2021 1:10:43 AM    I&O's Detail    2021 07:01  -  2021 19:29  --------------------------------------------------------  IN:    Albumin 5%  - 250 mL: 750 mL    Insulin: 19.5 mL    IV PiggyBack: 250 mL    IV PiggyBack: 100 mL    IV PiggyBack: 50 mL    Norepinephrine: 20 mL    sodium chloride 0.9%: 80 mL    sodium chloride 0.9%: 40 mL  Total IN: 1309.5 mL    OUT:    Chest Tube (mL): 300 mL    Chest Tube (mL): 75 mL    Indwelling Catheter - Urethral (mL): 795 mL  Total OUT: 1170 mL    Total NET: 139.5 mL          LABS:                        9.5    14.83 )-----------( 158      ( 2021 12:51 )             29.1         141  |  105  |  22.0<H>  ----------------------------<  197<H>  4.2   |  23.0  |  0.69    Ca    8.7      2021 12:51  Mg     3.2         TPro  5.4<L>  /  Alb  3.9  /  TBili  0.5  /  DBili  x   /  AST  22  /  ALT  8   /  AlkPhos  47  04    CARDIAC MARKERS ( 2021 12:51 )  x     / 0.35 ng/mL / x     / x     / x          PT/INR - ( 2021 12:51 )   PT: 14.6 sec;   INR: 1.27 ratio             I&O's Summary    2021 07:01  -  2021 19:29  --------------------------------------------------------  IN: 1309.5 mL / OUT: 1170 mL / NET: 139.5 mL      Data Reviewed  3.23.21 Cardiac Catheterization at [facility]    LVEF 40%    LM: Left Main: Normal    LAD:   Mid LAD There was 95% stenosis    1st Diagonal There was 90% stenosis    CX:   Mid Circumflex: There was a 100% stenosis    RCA: Distal RCA There was 95% stenosis   Severe CAD     3.23.21 Transthoracic echocardiogram at [facility]   -Left ventricle The cavity is normal. Normal thickness is present.   Mild -Moderate diffuse hypocontractility of the left ventricle is present. LVEF 40%   -Right ventricle. The cavity is normal. Right ventricular systolic function is normal   -Mitral valve: The leaflet is mildly thickened. There is trace mitral valve regurgitation   -Aortic valve: The valve is trileaflet/ The leaflets are normal thickness. There is no aortic stenosis. No valvular aortic regurgitation.   -Tricuspid valve: The tricuspid leaflet are not thickened. There is trace tricuspid valve regurgitation.   -Pericardium extracardiac. There is no pericardial effusion.     Carotid Doppler 2021 mild atherosxcelrosis
[employer]  # [id number] ([place]) used for this Encounter.    HPI:  76 year old female with PMH of CAD s/p MI, CHF, dementia, HLD and Type 2 DM, who is a poor historian who is admitted to [facility] on  for CABG for multivessel disease.   She is s/p 3 vessel CABG and LAD endarterectomy on 2021.  She has been on an insulin gtt in the postoperative setting, currently at 1.5 units per hour and our service is now consulted for diabetes management.    Patient reports she has Type 2 DM for "many years."  Her DM is moderately uncontrolled with A1c of 9.4%.  She cannot tell me her home medication regimen, although she states she takes oral medications for DM, but her H + P states that she takes insulin.  She does have associated macrovascular complications of CAD.    She currently complains of pain over her incision in her chest and nausea.      PAST MEDICAL & SURGICAL HISTORY:  CAD (coronary artery disease)  Type 2 diabetes mellitus  Hypertension  H/O myocardial ischemia  Hyperlipemia  Congestive heart failure  Dementia  H/O:     Allergies  No Known Allergies    MEDICATIONS  (STANDING):  aspirin 325 milliGRAM(s) Oral daily  atorvastatin 80 milliGRAM(s) Oral at bedtime  cefuroxime  IVPB 1500 milliGRAM(s) IV Intermittent every 8 hours  chlorhexidine 0.12% Liquid 5 milliLiter(s) Oral Mucosa two times a day  clopidogrel Tablet 75 milliGRAM(s) Oral daily  enoxaparin Injectable 40 milliGRAM(s) SubCutaneous daily  glucagon  Injectable 1 milliGRAM(s) IntraMuscular once  insulin lispro Injectable (ADMELOG) 2 Unit(s) SubCutaneous three times a day before meals  insulin regular Infusion 2 Unit(s)/Hr (2 mL/Hr) IV Continuous <Continuous>  metoprolol tartrate 25 milliGRAM(s) Oral two times a day  pantoprazole    Tablet 40 milliGRAM(s) Oral daily  sodium chloride 0.9%. 1000 milliLiter(s) (10 mL/Hr) IV Continuous <Continuous>  sodium chloride 0.9%. 1000 milliLiter(s) (5 mL/Hr) IV Continuous <Continuous>  vancomycin  IVPB 750 milliGRAM(s) IV Intermittent every 12 hours    SH:  no smoking or EtOH    FH:  no family history of DM    ROS:  as per HPI, otherwise 10 point ROS negative    Vital Signs Last 24 Hrs  T(C): 37.2 (2021 08:00), Max: 37.2 (2021 08:00)  T(F): 99 (2021 08:00), Max: 99 (2021 08:00)  HR: 67 (2021 16:00) (64 - 79)  BP: 132/63 (2021 16:00) (115/56 - 132/63)  BP(mean): 90 (2021 16:00) (80 - 90)  RR: 30 (2021 16:00) (10 - 53)  SpO2: 90% (2021 16:00) (90% - 100%)    PE:  Gen: elderly female, NAD  HEENT:  sclera anicteric, MMM  Neck:  no thyromegaly, no LAD  CV:  nl S1 + S2, RRR, no m/r/g  Resp:  nl respiratory effort, CTA b/l  GI/ Abd: soft, NT/ ND, BS +  Neuro:  No tremor, sensation intact to light touch on b/l feet  MS:  no c/c/e, nl muscle tone  Skin:  no acanthosis  Psych:  affect appropriate    LABS:  A1C with Estimated Average Glucose Result: 9.4 % (21 @ 18:58)    Thyroid Stimulating Hormone, Serum: 2.75 uIU/mL (21 @ 18:58)        144  |  109<H>  |  18.0  ----------------------------<  127<H>  4.5   |  24.0  |  0.76    Ca    8.8      2021 02:59  Mg     2.3         TPro  5.4<L>  /  Alb  3.9  /  TBili  0.5  /  DBili  x   /  AST  22  /  ALT  8   /  AlkPhos  47  04-19                          10.1   14.54 )-----------( 133      ( 2021 02:59 )             30.6     CAPILLARY BLOOD GLUCOSE  POCT Blood Glucose.: 137 mg/dL (2021 14:08)  POCT Blood Glucose.: 125 mg/dL (2021 11:27)  POCT Blood Glucose.: 152 mg/dL (2021 09:21)  POCT Blood Glucose.: 116 mg/dL (2021 07:38)  POCT Blood Glucose.: 148 mg/dL (2021 06:45)  POCT Blood Glucose.: 147 mg/dL (2021 04:26)  POCT Blood Glucose.: 122 mg/dL (2021 02:23)  POCT Blood Glucose.: 118 mg/dL (2021 00:39)  POCT Blood Glucose.: 132 mg/dL (2021 23:29)  POCT Blood Glucose.: 148 mg/dL (2021 22:34)  POCT Blood Glucose.: 153 mg/dL (2021 21:30)  POCT Blood Glucose.: 167 mg/dL (2021 19:37)  POCT Blood Glucose.: 168 mg/dL (2021 18:57)  POCT Blood Glucose.: 169 mg/dL (2021 18:05)  POCT Blood Glucose.: 174 mg/dL (2021 16:59)    
[facility]  [facility]   Office: [address] [place] [address]  Telephone: [phone] Fax:[phone]      HPI:  The patient is a 76 year old female with a history of CAD. She underwent elective CABG ((LIMA to LAD, SVG to RPDA, SVG to Diag)) on 4/19/2021 with Dr. Valenzuela. Other medical history is significant for DM, HTN, dementia and HLD. In the post-operative period the patient developed atrial fibrillation with RVR. She was given IV amiodarone followed by oral amiodarone. Subsequently, while in sinus rhythm the patient was noted to have periods of pacing via the epicardial pacing wires. Amiodarone was discontinued. Reported LVEF is 40% per echo at [facility] on 3/23/2021.       PAST MEDICAL & SURGICAL HISTORY:  CAD   Type 2 diabetes mellitus  Hypertension  Hyperlipemia  Congestive heart failure  Dementia      REVIEW OF SYSTEMS:  CONSTITUTIONAL: No fever, weight loss, or fatigue  EYES: No visual disturbances  NECK: No pain or stiffness  RESPIRATORY: No cough, wheezing, chills or hemoptysis; No shortness of breath  CARDIOVASCULAR: see HPI  GASTROINTESTINAL: No abdominal or epigastric pain. No nausea, vomiting, or hematemesis; No diarrhea or constipation. No melena or hematochezia.  NEUROLOGICAL: No headaches, memory loss, loss of strength, numbness, or tremors  SKIN: No itching, burning, rashes, or lesions   LYMPH NODES: No enlarged glands  ENDOCRINE: No heat or cold intolerance; No hair loss  PSYCHIATRIC: No depression, anxiety, mood swings, or difficulty sleeping  HEME/LYMPH: No easy bruising, or bleeding gums      MEDICATIONS  (STANDING):  aspirin 325 milliGRAM(s) Oral daily  atorvastatin 80 milliGRAM(s) Oral at bedtime  clopidogrel Tablet 75 milliGRAM(s) Oral daily  enoxaparin Injectable 40 milliGRAM(s) SubCutaneous daily  glucagon  Injectable 1 milliGRAM(s) IntraMuscular once  insulin glargine Injectable (LANTUS) 16 Unit(s) SubCutaneous at bedtime  insulin lispro (ADMELOG) corrective regimen sliding scale   SubCutaneous Before meals and at bedtime  insulin lispro Injectable (ADMELOG) 4 Unit(s) SubCutaneous three times a day with meals  metoprolol tartrate 25 milliGRAM(s) Oral two times a day  pantoprazole    Tablet 40 milliGRAM(s) Oral daily  sodium chloride 0.9% lock flush 3 milliLiter(s) IV Push every 8 hours    MEDICATIONS  (PRN):  acetaminophen   Tablet .. 650 milliGRAM(s) Oral every 6 hours PRN Mild Pain (1 - 3)  oxyCODONE    IR 5 milliGRAM(s) Oral every 4 hours PRN Moderate Pain (4 - 6)  oxyCODONE    IR 10 milliGRAM(s) Oral every 4 hours PRN Severe Pain (7 - 10)    Allergies  No Known Allergies      Social History:  Not a smoker. No alcohol    FAMILY HISTORY:  FH: heart disease (Sibling)  FH: diabetes mellitus (Sibling, Child)    Vital Signs Last 24 Hrs  T(C): 37.5 (23 Apr 2021 16:11), Max: 37.5 (23 Apr 2021 16:11)  T(F): 99.5 (23 Apr 2021 16:11), Max: 99.5 (23 Apr 2021 16:11)  HR: 68 (23 Apr 2021 16:11) (45 - 69)  BP: 130/76 (23 Apr 2021 16:11) (109/62 - 154/75)  BP(mean): --  RR: 18 (23 Apr 2021 16:11) (17 - 18)  SpO2: 95% (23 Apr 2021 16:11) (93% - 100%)    Physical Exam:  Constitutional: AAOx3, NAD  Neck: supple, No JVD  Cardiovascular: +S1S2 RRR, no murmurs, rubs, gallops   Pulmonary: CTA b/l, unlabored, no wheezes, rales. rhonci  Abdomen: +BS, soft NTND  Extremities: no edema b/l, +distal pulses b/l  Neuro: non focal, speech clear, VALENCIA x 4    LABS:                        9.0    11.94 )-----------( 200      ( 23 Apr 2021 06:19 )             28.0   04-23    143  |  104  |  40.0<H>  ----------------------------<  156<H>  3.7   |  27.0  |  0.88    Ca    8.5<L>      23 Apr 2021 06:19  Mg     2.3     04-23    ECG: Sinus rhythm, RBBB    Telemetery: Sinus rhythm, occasional pacing noted which is from pauses resulting from non-conducted PACs    Echo 3/23/2021 ([facility]): LVEF 40%

## 2021-04-24 LAB
ANION GAP SERPL CALC-SCNC: 11 MMOL/L — SIGNIFICANT CHANGE UP (ref 5–17)
BUN SERPL-MCNC: 37 MG/DL — HIGH (ref 8–20)
CALCIUM SERPL-MCNC: 8.6 MG/DL — SIGNIFICANT CHANGE UP (ref 8.6–10.2)
CHLORIDE SERPL-SCNC: 104 MMOL/L — SIGNIFICANT CHANGE UP (ref 98–107)
CO2 SERPL-SCNC: 27 MMOL/L — SIGNIFICANT CHANGE UP (ref 22–29)
CREAT SERPL-MCNC: 0.83 MG/DL — SIGNIFICANT CHANGE UP (ref 0.5–1.3)
GLUCOSE BLDC GLUCOMTR-MCNC: 132 MG/DL — HIGH (ref 70–99)
GLUCOSE BLDC GLUCOMTR-MCNC: 135 MG/DL — HIGH (ref 70–99)
GLUCOSE BLDC GLUCOMTR-MCNC: 169 MG/DL — HIGH (ref 70–99)
GLUCOSE BLDC GLUCOMTR-MCNC: 259 MG/DL — HIGH (ref 70–99)
GLUCOSE SERPL-MCNC: 117 MG/DL — HIGH (ref 70–99)
HCT VFR BLD CALC: 26.6 % — LOW (ref 34.5–45)
HGB BLD-MCNC: 8.5 G/DL — LOW (ref 11.5–15.5)
MAGNESIUM SERPL-MCNC: 2.3 MG/DL — SIGNIFICANT CHANGE UP (ref 1.6–2.6)
MCHC RBC-ENTMCNC: 27.8 PG — SIGNIFICANT CHANGE UP (ref 27–34)
MCHC RBC-ENTMCNC: 32 GM/DL — SIGNIFICANT CHANGE UP (ref 32–36)
MCV RBC AUTO: 86.9 FL — SIGNIFICANT CHANGE UP (ref 80–100)
PHOSPHATE SERPL-MCNC: 2.7 MG/DL — SIGNIFICANT CHANGE UP (ref 2.4–4.7)
PLATELET # BLD AUTO: 231 K/UL — SIGNIFICANT CHANGE UP (ref 150–400)
POTASSIUM SERPL-MCNC: 3.5 MMOL/L — SIGNIFICANT CHANGE UP (ref 3.5–5.3)
POTASSIUM SERPL-SCNC: 3.5 MMOL/L — SIGNIFICANT CHANGE UP (ref 3.5–5.3)
RBC # BLD: 3.06 M/UL — LOW (ref 3.8–5.2)
RBC # FLD: 16.1 % — HIGH (ref 10.3–14.5)
SODIUM SERPL-SCNC: 141 MMOL/L — SIGNIFICANT CHANGE UP (ref 135–145)
WBC # BLD: 11.72 K/UL — HIGH (ref 3.8–10.5)
WBC # FLD AUTO: 11.72 K/UL — HIGH (ref 3.8–10.5)

## 2021-04-24 PROCEDURE — 99232 SBSQ HOSP IP/OBS MODERATE 35: CPT

## 2021-04-24 PROCEDURE — 71045 X-RAY EXAM CHEST 1 VIEW: CPT | Mod: 26

## 2021-04-24 PROCEDURE — 93010 ELECTROCARDIOGRAM REPORT: CPT | Mod: 77

## 2021-04-24 PROCEDURE — 93010 ELECTROCARDIOGRAM REPORT: CPT

## 2021-04-24 RX ORDER — POTASSIUM CHLORIDE 20 MEQ
20 PACKET (EA) ORAL DAILY
Refills: 0 | Status: DISCONTINUED | OUTPATIENT
Start: 2021-04-25 | End: 2021-04-26

## 2021-04-24 RX ORDER — SERTRALINE 25 MG/1
50 TABLET, FILM COATED ORAL DAILY
Refills: 0 | Status: DISCONTINUED | OUTPATIENT
Start: 2021-04-24 | End: 2021-04-26

## 2021-04-24 RX ORDER — AMIODARONE HYDROCHLORIDE 400 MG/1
200 TABLET ORAL
Refills: 0 | Status: DISCONTINUED | OUTPATIENT
Start: 2021-04-24 | End: 2021-04-26

## 2021-04-24 RX ORDER — AMIODARONE HYDROCHLORIDE 400 MG/1
200 TABLET ORAL
Refills: 0 | Status: DISCONTINUED | OUTPATIENT
Start: 2021-04-24 | End: 2021-04-24

## 2021-04-24 RX ORDER — FUROSEMIDE 40 MG
40 TABLET ORAL DAILY
Refills: 0 | Status: DISCONTINUED | OUTPATIENT
Start: 2021-04-24 | End: 2021-04-26

## 2021-04-24 RX ORDER — POTASSIUM CHLORIDE 20 MEQ
40 PACKET (EA) ORAL ONCE
Refills: 0 | Status: COMPLETED | OUTPATIENT
Start: 2021-04-24 | End: 2021-04-24

## 2021-04-24 RX ORDER — AMLODIPINE BESYLATE 2.5 MG/1
5 TABLET ORAL DAILY
Refills: 0 | Status: DISCONTINUED | OUTPATIENT
Start: 2021-04-24 | End: 2021-04-26

## 2021-04-24 RX ORDER — AMIODARONE HYDROCHLORIDE 400 MG/1
400 TABLET ORAL ONCE
Refills: 0 | Status: COMPLETED | OUTPATIENT
Start: 2021-04-24 | End: 2021-04-24

## 2021-04-24 RX ORDER — POTASSIUM CHLORIDE 20 MEQ
40 PACKET (EA) ORAL ONCE
Refills: 0 | Status: DISCONTINUED | OUTPATIENT
Start: 2021-04-24 | End: 2021-04-24

## 2021-04-24 RX ADMIN — Medication 12.5 MILLIGRAM(S): at 08:28

## 2021-04-24 RX ADMIN — Medication 6: at 17:04

## 2021-04-24 RX ADMIN — SODIUM CHLORIDE 3 MILLILITER(S): 9 INJECTION INTRAMUSCULAR; INTRAVENOUS; SUBCUTANEOUS at 08:40

## 2021-04-24 RX ADMIN — SODIUM CHLORIDE 3 MILLILITER(S): 9 INJECTION INTRAMUSCULAR; INTRAVENOUS; SUBCUTANEOUS at 05:55

## 2021-04-24 RX ADMIN — Medication 4 UNIT(S): at 08:27

## 2021-04-24 RX ADMIN — AMIODARONE HYDROCHLORIDE 400 MILLIGRAM(S): 400 TABLET ORAL at 08:41

## 2021-04-24 RX ADMIN — PANTOPRAZOLE SODIUM 40 MILLIGRAM(S): 20 TABLET, DELAYED RELEASE ORAL at 08:32

## 2021-04-24 RX ADMIN — Medication 4 UNIT(S): at 17:06

## 2021-04-24 RX ADMIN — Medication 325 MILLIGRAM(S): at 08:27

## 2021-04-24 RX ADMIN — CLOPIDOGREL BISULFATE 75 MILLIGRAM(S): 75 TABLET, FILM COATED ORAL at 08:28

## 2021-04-24 RX ADMIN — SERTRALINE 50 MILLIGRAM(S): 25 TABLET, FILM COATED ORAL at 12:14

## 2021-04-24 RX ADMIN — ATORVASTATIN CALCIUM 80 MILLIGRAM(S): 80 TABLET, FILM COATED ORAL at 21:46

## 2021-04-24 RX ADMIN — Medication 4 UNIT(S): at 12:15

## 2021-04-24 RX ADMIN — Medication 2: at 21:45

## 2021-04-24 RX ADMIN — Medication 12.5 MILLIGRAM(S): at 21:46

## 2021-04-24 RX ADMIN — AMIODARONE HYDROCHLORIDE 200 MILLIGRAM(S): 400 TABLET ORAL at 17:04

## 2021-04-24 RX ADMIN — Medication 40 MILLIEQUIVALENT(S): at 12:14

## 2021-04-24 RX ADMIN — INSULIN GLARGINE 16 UNIT(S): 100 INJECTION, SOLUTION SUBCUTANEOUS at 21:46

## 2021-04-24 RX ADMIN — Medication 40 MILLIGRAM(S): at 12:14

## 2021-04-24 RX ADMIN — ENOXAPARIN SODIUM 40 MILLIGRAM(S): 100 INJECTION SUBCUTANEOUS at 21:45

## 2021-04-24 RX ADMIN — AMLODIPINE BESYLATE 5 MILLIGRAM(S): 2.5 TABLET ORAL at 08:27

## 2021-04-24 RX ADMIN — Medication 40 MILLIEQUIVALENT(S): at 05:55

## 2021-04-24 RX ADMIN — SODIUM CHLORIDE 3 MILLILITER(S): 9 INJECTION INTRAMUSCULAR; INTRAVENOUS; SUBCUTANEOUS at 21:50

## 2021-04-24 NOTE — CHART NOTE - NSCHARTNOTEFT_GEN_A_CORE
CTS Addendum ACP note    76 year old female with a medical history of type 2 DM (HA1C 9.4 on insulin at home), CAD, CHF, dementia, HTN, HLD, now s/p CABG X 3 (LIMA to LAD, SVG to RPDA, SVG to DIAG) with LAD endarterectomy and right SVG harvest on 4/19/21 with Dr. Valenzuela. Postoperative course complicated by FIDELINA (requiring amio gtt, converted back to NSR, now transitioned to PO amio load).  On 4/23 patient noted to tata down and require pacing, medications adjusted as per EP recommendations. Still overnight patient intermittently bradycardic requiring backup pacing at 40bpm.     Pt. seen & examined, [place]  utilized [employer] [id number]. Pt. reports having palpitations this morning, denies any chest pain or SOB.  Pt. OOB to chair. Sinus rhythm as per telemetry, bradycardic episodes of pacing noted last night at 40 with intermittent PAF.    Physical Exam  Neuro: A+O x 2, non-focal, speech clear and intact  Pulm: Diminished at the bases  CV: RRR, +S1S2  Abd: soft, NT, ND, + BS, +BM 4/24/21  Skin: warm, dry, well perfused, VALENCIA x 4, trace edema x2BLE  Incisions: midsternal incision CDI + mepilex sternum stable, RSVG incision CDI + mepilex  Chest: +PW (settings: VVI, rate: 40, mA: 20, sensitivity: 0.8) Threshold 6.0        Plan:  Continue oxy IR PRN and Tylenol PRN for pain management  Remains in NSR  Continue Lopressor 12.5mg BID & Amio 200mg BID for rate control  Norvasc ordered for blood pressure control  Follow EKG in am to assess QTC   Follow up AM EKG.   Continue ASA, Plavix, & Lipitor for graft patency.   Encourage CDBEs, I/S, and ambulate as tolerated, daily PT.   EP following  Check Thresholds daily  Continue AC/HS, WALDO, pre-meal & lantus  Repeat serum potassium in am, Potassium supplemented   Lasix 40mg PO daily ordered as per Dr. Valenzuela  Strict I/O's  Repeat chest xray in am  Daily weights  Continue Protonix for GI prophylaxis  Discussed plan with Dr. De Jesus & CTS in am rounds. CTS Addendum ACP note    76 year old female with a medical history of type 2 DM (HA1C 9.4 on insulin at home), CAD, CHF, dementia, HTN, HLD, now s/p CABG X 3 (LIMA to LAD, SVG to RPDA, SVG to DIAG) with LAD endarterectomy and right SVG harvest on 4/19/21 with Dr. Valenzuela. Postoperative course complicated by FIDELINA (requiring amio gtt, converted back to NSR, now transitioned to PO amio load).  On 4/23 patient noted to tata down and require pacing, medications adjusted as per EP recommendations. Still overnight patient intermittently bradycardic requiring backup pacing at 40bpm.     Pt. seen & examined, [place]  utilized [employer] [id number]. Pt. reports having palpitations this morning, denies any chest pain or SOB.  Pt. OOB to chair. Sinus rhythm as per telemetry, bradycardic episodes of pacing noted last night at 40 with intermittent PAF.    Physical Exam  Neuro: A+O x 2, non-focal, speech clear and intact  Pulm: Diminished at the bases  CV: RRR, +S1S2  Abd: soft, NT, ND, + BS, +BM 4/24/21  Skin: warm, dry, well perfused, VALENCIA x 4, trace edema x2BLE  Incisions: midsternal incision CDI + mepilex sternum stable, RSVG incision CDI + mepilex  Chest: +PW (settings: VVI, rate: 40, mA: 20, sensitivity: 0.8) Threshold 6.0        Plan:  Continue oxy IR PRN and Tylenol PRN for pain management  Remains in NSR  Continue Lopressor 12.5mg BID & Amio 200mg BID for rate control  Norvasc ordered for blood pressure control  Follow EKG in am to assess QTC   Follow up AM EKG.   Continue ASA, Plavix, & Lipitor for graft patency.   Encourage CDBEs, I/S, and ambulate as tolerated, daily PT.   EP following  Check Thresholds daily  Continue AC/HS, WALDO, pre-meal & lantus  Repeat serum potassium in am, Potassium supplemented   Lasix 40mg PO daily ordered as per Dr. Valenzuela  Strict I/O's  Repeat chest xray in am  Daily weights  Trend H/H, no transfusion as per Dr. Valenzuela  FeSO4, folate, & Vitamin C for ordered for anemia supplementation  Continue Protonix for GI prophylaxis  Discussed plan with Dr. De Jesus & CTS in am rounds.

## 2021-04-24 NOTE — PROGRESS NOTE ADULT - ATTENDING COMMENTS
seen and examined, history with aide of Nepali      TELE showed occasional SB/JR < 40 needing pacing and symptomatic (SOB and palpitation) AFIB with RVR up 150 for 15 minutes earlier today despite adjustment of metoprolol and amiodarone doses.     - Will likely need PM, but will defer final decision to tomorrow. If needed a PM, will plan to do Monday morning. Will need to be NPO sunday midnight and will need COVID swab within 72 hours from Monday.  - AC when safe to do so by CTS  - Keep current dose metoprolol and amiodarone for now    above d/w patient and CTS team

## 2021-04-25 LAB
ALBUMIN SERPL ELPH-MCNC: 3.8 G/DL — SIGNIFICANT CHANGE UP (ref 3.3–5.2)
ALP SERPL-CCNC: 82 U/L — SIGNIFICANT CHANGE UP (ref 40–120)
ALT FLD-CCNC: 13 U/L — SIGNIFICANT CHANGE UP
ANION GAP SERPL CALC-SCNC: 12 MMOL/L — SIGNIFICANT CHANGE UP (ref 5–17)
AST SERPL-CCNC: 14 U/L — SIGNIFICANT CHANGE UP
BILIRUB SERPL-MCNC: 0.8 MG/DL — SIGNIFICANT CHANGE UP (ref 0.4–2)
BUN SERPL-MCNC: 26 MG/DL — HIGH (ref 8–20)
CALCIUM SERPL-MCNC: 9.1 MG/DL — SIGNIFICANT CHANGE UP (ref 8.6–10.2)
CHLORIDE SERPL-SCNC: 97 MMOL/L — LOW (ref 98–107)
CO2 SERPL-SCNC: 28 MMOL/L — SIGNIFICANT CHANGE UP (ref 22–29)
CREAT SERPL-MCNC: 0.77 MG/DL — SIGNIFICANT CHANGE UP (ref 0.5–1.3)
GLUCOSE BLDC GLUCOMTR-MCNC: 100 MG/DL — HIGH (ref 70–99)
GLUCOSE BLDC GLUCOMTR-MCNC: 195 MG/DL — HIGH (ref 70–99)
GLUCOSE BLDC GLUCOMTR-MCNC: 213 MG/DL — HIGH (ref 70–99)
GLUCOSE BLDC GLUCOMTR-MCNC: 235 MG/DL — HIGH (ref 70–99)
GLUCOSE BLDC GLUCOMTR-MCNC: 270 MG/DL — HIGH (ref 70–99)
GLUCOSE SERPL-MCNC: 158 MG/DL — HIGH (ref 70–99)
HCT VFR BLD CALC: 33 % — LOW (ref 34.5–45)
HGB BLD-MCNC: 10.4 G/DL — LOW (ref 11.5–15.5)
MAGNESIUM SERPL-MCNC: 2 MG/DL — SIGNIFICANT CHANGE UP (ref 1.8–2.6)
MCHC RBC-ENTMCNC: 27.1 PG — SIGNIFICANT CHANGE UP (ref 27–34)
MCHC RBC-ENTMCNC: 31.5 GM/DL — LOW (ref 32–36)
MCV RBC AUTO: 85.9 FL — SIGNIFICANT CHANGE UP (ref 80–100)
PHOSPHATE SERPL-MCNC: 2.2 MG/DL — LOW (ref 2.4–4.7)
PLATELET # BLD AUTO: 335 K/UL — SIGNIFICANT CHANGE UP (ref 150–400)
POTASSIUM SERPL-MCNC: 4.2 MMOL/L — SIGNIFICANT CHANGE UP (ref 3.5–5.3)
POTASSIUM SERPL-SCNC: 4.2 MMOL/L — SIGNIFICANT CHANGE UP (ref 3.5–5.3)
PROT SERPL-MCNC: 6.8 G/DL — SIGNIFICANT CHANGE UP (ref 6.6–8.7)
RBC # BLD: 3.84 M/UL — SIGNIFICANT CHANGE UP (ref 3.8–5.2)
RBC # FLD: 16.1 % — HIGH (ref 10.3–14.5)
SARS-COV-2 RNA SPEC QL NAA+PROBE: SIGNIFICANT CHANGE UP
SODIUM SERPL-SCNC: 137 MMOL/L — SIGNIFICANT CHANGE UP (ref 135–145)
WBC # BLD: 12.41 K/UL — HIGH (ref 3.8–10.5)
WBC # FLD AUTO: 12.41 K/UL — HIGH (ref 3.8–10.5)

## 2021-04-25 PROCEDURE — 99232 SBSQ HOSP IP/OBS MODERATE 35: CPT

## 2021-04-25 PROCEDURE — 71045 X-RAY EXAM CHEST 1 VIEW: CPT | Mod: 26

## 2021-04-25 PROCEDURE — 93010 ELECTROCARDIOGRAM REPORT: CPT

## 2021-04-25 RX ORDER — INSULIN GLARGINE 100 [IU]/ML
18 INJECTION, SOLUTION SUBCUTANEOUS AT BEDTIME
Refills: 0 | Status: DISCONTINUED | OUTPATIENT
Start: 2021-04-25 | End: 2021-04-26

## 2021-04-25 RX ORDER — SORBITOL SOLUTION 70 %
30 SOLUTION, ORAL MISCELLANEOUS ONCE
Refills: 0 | Status: COMPLETED | OUTPATIENT
Start: 2021-04-25 | End: 2021-04-25

## 2021-04-25 RX ADMIN — Medication 2: at 17:10

## 2021-04-25 RX ADMIN — Medication 40 MILLIGRAM(S): at 05:54

## 2021-04-25 RX ADMIN — AMIODARONE HYDROCHLORIDE 200 MILLIGRAM(S): 400 TABLET ORAL at 05:54

## 2021-04-25 RX ADMIN — SERTRALINE 50 MILLIGRAM(S): 25 TABLET, FILM COATED ORAL at 11:50

## 2021-04-25 RX ADMIN — Medication 4 UNIT(S): at 12:33

## 2021-04-25 RX ADMIN — Medication 20 MILLIEQUIVALENT(S): at 11:50

## 2021-04-25 RX ADMIN — SODIUM CHLORIDE 3 MILLILITER(S): 9 INJECTION INTRAMUSCULAR; INTRAVENOUS; SUBCUTANEOUS at 21:07

## 2021-04-25 RX ADMIN — ATORVASTATIN CALCIUM 80 MILLIGRAM(S): 80 TABLET, FILM COATED ORAL at 21:26

## 2021-04-25 RX ADMIN — SODIUM CHLORIDE 3 MILLILITER(S): 9 INJECTION INTRAMUSCULAR; INTRAVENOUS; SUBCUTANEOUS at 06:00

## 2021-04-25 RX ADMIN — Medication 4: at 21:27

## 2021-04-25 RX ADMIN — INSULIN GLARGINE 18 UNIT(S): 100 INJECTION, SOLUTION SUBCUTANEOUS at 21:41

## 2021-04-25 RX ADMIN — Medication 12.5 MILLIGRAM(S): at 05:54

## 2021-04-25 RX ADMIN — PANTOPRAZOLE SODIUM 40 MILLIGRAM(S): 20 TABLET, DELAYED RELEASE ORAL at 11:50

## 2021-04-25 RX ADMIN — SODIUM CHLORIDE 3 MILLILITER(S): 9 INJECTION INTRAMUSCULAR; INTRAVENOUS; SUBCUTANEOUS at 12:43

## 2021-04-25 RX ADMIN — AMLODIPINE BESYLATE 5 MILLIGRAM(S): 2.5 TABLET ORAL at 05:54

## 2021-04-25 RX ADMIN — CLOPIDOGREL BISULFATE 75 MILLIGRAM(S): 75 TABLET, FILM COATED ORAL at 11:50

## 2021-04-25 RX ADMIN — Medication 4 UNIT(S): at 08:24

## 2021-04-25 RX ADMIN — Medication 6: at 08:25

## 2021-04-25 RX ADMIN — Medication 325 MILLIGRAM(S): at 11:54

## 2021-04-25 RX ADMIN — AMIODARONE HYDROCHLORIDE 200 MILLIGRAM(S): 400 TABLET ORAL at 17:10

## 2021-04-25 RX ADMIN — Medication 4 UNIT(S): at 17:11

## 2021-04-25 RX ADMIN — Medication 30 MILLILITER(S): at 11:50

## 2021-04-25 RX ADMIN — Medication 12.5 MILLIGRAM(S): at 17:11

## 2021-04-25 NOTE — CHART NOTE - NSCHARTNOTEFT_GEN_A_CORE
Tele reviewed  sinus mostly 70-80s, no SB < 40 and no AF. She only had 15 minutes of rapid AF yesterday monring which we commented on our notes yesterday.   If no more symptomatic or prolonged AF with RVR and no symptomatic bradycardia then will likely be able to avoid PM and continue on the current plan for metoprolol and amiodarone with outpatient follow up with DR. day with [facility]    Asked to keep patient NPO past midnight, in case more tachy-tata episodes till tomorrow morning

## 2021-04-26 ENCOUNTER — TRANSCRIPTION ENCOUNTER (OUTPATIENT)
Age: 76
End: 2021-04-26

## 2021-04-26 VITALS
DIASTOLIC BLOOD PRESSURE: 76 MMHG | OXYGEN SATURATION: 96 % | SYSTOLIC BLOOD PRESSURE: 157 MMHG | TEMPERATURE: 98 F | HEART RATE: 87 BPM | RESPIRATION RATE: 17 BRPM

## 2021-04-26 PROBLEM — F03.90 UNSPECIFIED DEMENTIA WITHOUT BEHAVIORAL DISTURBANCE: Chronic | Status: ACTIVE | Noted: 2021-04-14

## 2021-04-26 PROBLEM — I50.9 HEART FAILURE, UNSPECIFIED: Chronic | Status: ACTIVE | Noted: 2021-04-14

## 2021-04-26 PROBLEM — Z86.79 PERSONAL HISTORY OF OTHER DISEASES OF THE CIRCULATORY SYSTEM: Chronic | Status: ACTIVE | Noted: 2021-04-14

## 2021-04-26 PROBLEM — Z91.89 OTHER SPECIFIED PERSONAL RISK FACTORS, NOT ELSEWHERE CLASSIFIED: Chronic | Status: ACTIVE | Noted: 2021-04-14

## 2021-04-26 PROBLEM — E78.5 HYPERLIPIDEMIA, UNSPECIFIED: Chronic | Status: ACTIVE | Noted: 2021-04-14

## 2021-04-26 PROBLEM — E11.9 TYPE 2 DIABETES MELLITUS WITHOUT COMPLICATIONS: Chronic | Status: ACTIVE | Noted: 2021-04-14

## 2021-04-26 PROBLEM — I10 ESSENTIAL (PRIMARY) HYPERTENSION: Chronic | Status: ACTIVE | Noted: 2021-04-14

## 2021-04-26 PROBLEM — I25.10 ATHEROSCLEROTIC HEART DISEASE OF NATIVE CORONARY ARTERY WITHOUT ANGINA PECTORIS: Chronic | Status: ACTIVE | Noted: 2021-04-14

## 2021-04-26 LAB
ALBUMIN SERPL ELPH-MCNC: 3.7 G/DL — SIGNIFICANT CHANGE UP (ref 3.3–5.2)
ALP SERPL-CCNC: 73 U/L — SIGNIFICANT CHANGE UP (ref 40–120)
ALT FLD-CCNC: 13 U/L — SIGNIFICANT CHANGE UP
ANION GAP SERPL CALC-SCNC: 12 MMOL/L — SIGNIFICANT CHANGE UP (ref 5–17)
AST SERPL-CCNC: 15 U/L — SIGNIFICANT CHANGE UP
BILIRUB SERPL-MCNC: 0.7 MG/DL — SIGNIFICANT CHANGE UP (ref 0.4–2)
BUN SERPL-MCNC: 29 MG/DL — HIGH (ref 8–20)
CALCIUM SERPL-MCNC: 8.9 MG/DL — SIGNIFICANT CHANGE UP (ref 8.6–10.2)
CHLORIDE SERPL-SCNC: 98 MMOL/L — SIGNIFICANT CHANGE UP (ref 98–107)
CO2 SERPL-SCNC: 30 MMOL/L — HIGH (ref 22–29)
CREAT SERPL-MCNC: 0.76 MG/DL — SIGNIFICANT CHANGE UP (ref 0.5–1.3)
GLUCOSE BLDC GLUCOMTR-MCNC: 123 MG/DL — HIGH (ref 70–99)
GLUCOSE BLDC GLUCOMTR-MCNC: 126 MG/DL — HIGH (ref 70–99)
GLUCOSE BLDC GLUCOMTR-MCNC: 164 MG/DL — HIGH (ref 70–99)
GLUCOSE SERPL-MCNC: 171 MG/DL — HIGH (ref 70–99)
HCT VFR BLD CALC: 32.9 % — LOW (ref 34.5–45)
HGB BLD-MCNC: 10.5 G/DL — LOW (ref 11.5–15.5)
MAGNESIUM SERPL-MCNC: 2.1 MG/DL — SIGNIFICANT CHANGE UP (ref 1.6–2.6)
MCHC RBC-ENTMCNC: 27.4 PG — SIGNIFICANT CHANGE UP (ref 27–34)
MCHC RBC-ENTMCNC: 31.9 GM/DL — LOW (ref 32–36)
MCV RBC AUTO: 85.9 FL — SIGNIFICANT CHANGE UP (ref 80–100)
PLATELET # BLD AUTO: 355 K/UL — SIGNIFICANT CHANGE UP (ref 150–400)
POTASSIUM SERPL-MCNC: 4.2 MMOL/L — SIGNIFICANT CHANGE UP (ref 3.5–5.3)
POTASSIUM SERPL-SCNC: 4.2 MMOL/L — SIGNIFICANT CHANGE UP (ref 3.5–5.3)
PROT SERPL-MCNC: 6.6 G/DL — SIGNIFICANT CHANGE UP (ref 6.6–8.7)
RBC # BLD: 3.83 M/UL — SIGNIFICANT CHANGE UP (ref 3.8–5.2)
RBC # FLD: 16.3 % — HIGH (ref 10.3–14.5)
SODIUM SERPL-SCNC: 140 MMOL/L — SIGNIFICANT CHANGE UP (ref 135–145)
SURGICAL PATHOLOGY STUDY: SIGNIFICANT CHANGE UP
WBC # BLD: 13.41 K/UL — HIGH (ref 3.8–10.5)
WBC # FLD AUTO: 13.41 K/UL — HIGH (ref 3.8–10.5)

## 2021-04-26 PROCEDURE — 36415 COLL VENOUS BLD VENIPUNCTURE: CPT

## 2021-04-26 PROCEDURE — 97163 PT EVAL HIGH COMPLEX 45 MIN: CPT

## 2021-04-26 PROCEDURE — 84132 ASSAY OF SERUM POTASSIUM: CPT

## 2021-04-26 PROCEDURE — 83605 ASSAY OF LACTIC ACID: CPT

## 2021-04-26 PROCEDURE — C1889: CPT

## 2021-04-26 PROCEDURE — P9016: CPT

## 2021-04-26 PROCEDURE — 88311 DECALCIFY TISSUE: CPT

## 2021-04-26 PROCEDURE — 94002 VENT MGMT INPAT INIT DAY: CPT

## 2021-04-26 PROCEDURE — 36430 TRANSFUSION BLD/BLD COMPNT: CPT

## 2021-04-26 PROCEDURE — 85610 PROTHROMBIN TIME: CPT

## 2021-04-26 PROCEDURE — 97110 THERAPEUTIC EXERCISES: CPT

## 2021-04-26 PROCEDURE — 93005 ELECTROCARDIOGRAM TRACING: CPT

## 2021-04-26 PROCEDURE — U0005: CPT

## 2021-04-26 PROCEDURE — P9045: CPT

## 2021-04-26 PROCEDURE — 82947 ASSAY GLUCOSE BLOOD QUANT: CPT

## 2021-04-26 PROCEDURE — 71045 X-RAY EXAM CHEST 1 VIEW: CPT | Mod: 26

## 2021-04-26 PROCEDURE — 83735 ASSAY OF MAGNESIUM: CPT

## 2021-04-26 PROCEDURE — 82803 BLOOD GASES ANY COMBINATION: CPT

## 2021-04-26 PROCEDURE — 85014 HEMATOCRIT: CPT

## 2021-04-26 PROCEDURE — 82962 GLUCOSE BLOOD TEST: CPT

## 2021-04-26 PROCEDURE — 85018 HEMOGLOBIN: CPT

## 2021-04-26 PROCEDURE — U0003: CPT

## 2021-04-26 PROCEDURE — 93880 EXTRACRANIAL BILAT STUDY: CPT

## 2021-04-26 PROCEDURE — 99231 SBSQ HOSP IP/OBS SF/LOW 25: CPT

## 2021-04-26 PROCEDURE — 99233 SBSQ HOSP IP/OBS HIGH 50: CPT | Mod: 24

## 2021-04-26 PROCEDURE — 88304 TISSUE EXAM BY PATHOLOGIST: CPT

## 2021-04-26 PROCEDURE — 80053 COMPREHEN METABOLIC PANEL: CPT

## 2021-04-26 PROCEDURE — 85027 COMPLETE CBC AUTOMATED: CPT

## 2021-04-26 PROCEDURE — 71045 X-RAY EXAM CHEST 1 VIEW: CPT

## 2021-04-26 PROCEDURE — 94760 N-INVAS EAR/PLS OXIMETRY 1: CPT

## 2021-04-26 PROCEDURE — 84484 ASSAY OF TROPONIN QUANT: CPT

## 2021-04-26 PROCEDURE — 84295 ASSAY OF SERUM SODIUM: CPT

## 2021-04-26 PROCEDURE — 80048 BASIC METABOLIC PNL TOTAL CA: CPT

## 2021-04-26 PROCEDURE — 97530 THERAPEUTIC ACTIVITIES: CPT

## 2021-04-26 PROCEDURE — 84100 ASSAY OF PHOSPHORUS: CPT

## 2021-04-26 PROCEDURE — 97116 GAIT TRAINING THERAPY: CPT

## 2021-04-26 PROCEDURE — 86769 SARS-COV-2 COVID-19 ANTIBODY: CPT

## 2021-04-26 PROCEDURE — 82330 ASSAY OF CALCIUM: CPT

## 2021-04-26 PROCEDURE — 82435 ASSAY OF BLOOD CHLORIDE: CPT

## 2021-04-26 RX ORDER — METOPROLOL TARTRATE 50 MG
1 TABLET ORAL
Qty: 30 | Refills: 1
Start: 2021-04-26 | End: 2021-06-24

## 2021-04-26 RX ORDER — INSULIN LISPRO 100/ML
0 VIAL (ML) SUBCUTANEOUS
Qty: 0 | Refills: 0 | DISCHARGE

## 2021-04-26 RX ORDER — ATORVASTATIN CALCIUM 80 MG/1
1 TABLET, FILM COATED ORAL
Qty: 0 | Refills: 0 | DISCHARGE

## 2021-04-26 RX ORDER — ISOSORBIDE MONONITRATE 60 MG/1
60 TABLET, EXTENDED RELEASE ORAL
Qty: 0 | Refills: 0 | DISCHARGE

## 2021-04-26 RX ORDER — CLOPIDOGREL BISULFATE 75 MG/1
1 TABLET, FILM COATED ORAL
Qty: 30 | Refills: 1
Start: 2021-04-26 | End: 2021-06-24

## 2021-04-26 RX ORDER — POTASSIUM CHLORIDE 20 MEQ
1 PACKET (EA) ORAL
Qty: 30 | Refills: 0
Start: 2021-04-26 | End: 2021-05-25

## 2021-04-26 RX ORDER — ASPIRIN/CALCIUM CARB/MAGNESIUM 324 MG
0 TABLET ORAL
Qty: 0 | Refills: 0 | DISCHARGE

## 2021-04-26 RX ORDER — LOSARTAN POTASSIUM 100 MG/1
100 TABLET, FILM COATED ORAL
Qty: 0 | Refills: 0 | DISCHARGE

## 2021-04-26 RX ORDER — HALOPERIDOL DECANOATE 100 MG/ML
2.5 INJECTION INTRAMUSCULAR ONCE
Refills: 0 | Status: COMPLETED | OUTPATIENT
Start: 2021-04-26 | End: 2021-04-26

## 2021-04-26 RX ORDER — INSULIN GLARGINE 100 [IU]/ML
0 INJECTION, SOLUTION SUBCUTANEOUS
Qty: 0 | Refills: 0 | DISCHARGE

## 2021-04-26 RX ORDER — ACETAMINOPHEN 500 MG
2 TABLET ORAL
Qty: 0 | Refills: 0 | DISCHARGE
Start: 2021-04-26

## 2021-04-26 RX ORDER — ALBUTEROL 90 UG/1
0 AEROSOL, METERED ORAL
Qty: 0 | Refills: 0 | DISCHARGE

## 2021-04-26 RX ORDER — METOPROLOL TARTRATE 50 MG
1 TABLET ORAL
Qty: 0 | Refills: 0 | DISCHARGE

## 2021-04-26 RX ORDER — AMLODIPINE BESYLATE 2.5 MG/1
1 TABLET ORAL
Qty: 30 | Refills: 1
Start: 2021-04-26 | End: 2021-06-24

## 2021-04-26 RX ORDER — AMIODARONE HYDROCHLORIDE 400 MG/1
1 TABLET ORAL
Qty: 30 | Refills: 0
Start: 2021-04-26 | End: 2021-05-25

## 2021-04-26 RX ORDER — FUROSEMIDE 40 MG
1 TABLET ORAL
Qty: 0 | Refills: 0 | DISCHARGE

## 2021-04-26 RX ADMIN — Medication 12.5 MILLIGRAM(S): at 08:43

## 2021-04-26 RX ADMIN — SERTRALINE 50 MILLIGRAM(S): 25 TABLET, FILM COATED ORAL at 08:44

## 2021-04-26 RX ADMIN — PANTOPRAZOLE SODIUM 40 MILLIGRAM(S): 20 TABLET, DELAYED RELEASE ORAL at 08:44

## 2021-04-26 RX ADMIN — SODIUM CHLORIDE 3 MILLILITER(S): 9 INJECTION INTRAMUSCULAR; INTRAVENOUS; SUBCUTANEOUS at 12:14

## 2021-04-26 RX ADMIN — Medication 2: at 08:05

## 2021-04-26 RX ADMIN — Medication 4 UNIT(S): at 08:05

## 2021-04-26 RX ADMIN — Medication 20 MILLIEQUIVALENT(S): at 08:43

## 2021-04-26 RX ADMIN — SODIUM CHLORIDE 3 MILLILITER(S): 9 INJECTION INTRAMUSCULAR; INTRAVENOUS; SUBCUTANEOUS at 05:06

## 2021-04-26 RX ADMIN — Medication 4 UNIT(S): at 12:44

## 2021-04-26 RX ADMIN — Medication 325 MILLIGRAM(S): at 08:43

## 2021-04-26 RX ADMIN — Medication 40 MILLIGRAM(S): at 08:43

## 2021-04-26 RX ADMIN — HALOPERIDOL DECANOATE 2.5 MILLIGRAM(S): 100 INJECTION INTRAMUSCULAR at 00:45

## 2021-04-26 RX ADMIN — AMIODARONE HYDROCHLORIDE 200 MILLIGRAM(S): 400 TABLET ORAL at 08:43

## 2021-04-26 RX ADMIN — AMLODIPINE BESYLATE 5 MILLIGRAM(S): 2.5 TABLET ORAL at 08:43

## 2021-04-26 RX ADMIN — CLOPIDOGREL BISULFATE 75 MILLIGRAM(S): 75 TABLET, FILM COATED ORAL at 12:45

## 2021-04-26 NOTE — DISCHARGE NOTE PROVIDER - PROVIDER TOKENS
PROVIDER:[TOKEN:[[id number]],SCHEDULEDAPPT:[05/11/2021],SCHEDULEDAPPTTIME:[10:00 AM]],PROVIDER:[TOKEN:[[id number]],FOLLOWUP:[1 month]]

## 2021-04-26 NOTE — DISCHARGE NOTE PROVIDER - CARE PROVIDERS DIRECT ADDRESSES
,[email or web address],[email or web address]

## 2021-04-26 NOTE — CHART NOTE - NSCHARTNOTEFT_GEN_A_CORE
BS reviewed   Pt not at bedside    Pt will need to be transiitoned from Lantus to 70/30 - can try  10 unit BID ac breakfast and dinner

## 2021-04-26 NOTE — PROGRESS NOTE ADULT - ASSESSMENT
76 year old female with a history HTN, DM and CAD s/p 3v CABG on 4/19/2021. Post-op course complicated by atrial fibrillation with RVR. Was given IV amiodarone which resulted in conversion to sinus rhythm. Subsequently, noted to have pauses on telemetry resulting in pacing via epicardial wires. Pacing was mostly secondary to non-conducted PACS. No AF or pacing requirement over the past two days. Note that PACS were now conducted.     Recommendations  - Change amiodarone to 200 mg daily at discharge  - Continue metoprolol 12.5 mg bid  - Should be discharged home on therapeutic oral anticoagulation (CHADSVASC score is 5) given elevated stroke risk  - Can discontinue epicardial pacing wires  
76 year old female with PMH of type 2 DM, CAD, CHF, dementia, HLD admitted with multivessel CAD s/p 3V CABG and LAD endarterectomy on 4/19.        1.  Type 2 DM-    Diabetes is currently well controlled on insulin.  Would continue current doses of Lantus and Admelog.    Would recommend discharge on basal bolus insulin regimen.   2.  CAD-  s/p CABG.  Management as per CT surgery team.  Continue ASA, statin and metoprolol.  3.  HLD-  continue high dose statin therapy.  
76F hx HTN, HLD, DM, s/p LHC at [facility] subsequent to MI 3/23/2021 which showed multivessel CAD; Echo 3/23/2021 [facility] LVEF 40;  pt inially declined surgery and sought second opinion. Pt was admitted to [facility] for CABG with Dr Valenzuela. She is now s/p CABG 4/19 LIMA to LAD, SVG to RPDA, SVG to DIAG and LAD endarterectomy. Pt is seen in the SICU post-op and  currently intubated sedated.    CAD s/p CABG 4/19  CM: LVEF 40%  Hemodynamically stable  Cont ASA, Clopidogrel, statin, BB  May need to start smalll dose of furosmeide and cont GDMT    
76F hx HTN, HLD, DM, s/p LHC at [facility] subsequent to MI 3/23/2021 which showed multivessel CAD; Echo 3/23/2021 [facility] LVEF 40;  pt inially declined surgery and sought second opinion. Pt was admitted to [facility] for CABG with Dr Valenzuela. She is now s/p CABG 4/19 LIMA to LAD, SVG to RPDA, SVG to DIAG and LAD endarterectomy. Pt is seen in the SICU post-op and  currently intubated sedated.    CAD s/p CABG 4/19  CM: LVEF 40%  Extubated  Cont ASA, Clopidogrel, statin, BB  Advance heart failure meds as tolerated    CARDIO MEDS  metoprolol tartrate 25 milliGRAM(s) Oral two times a day  aspirin 325 milliGRAM(s) Oral daily  atorvastatin 80 milliGRAM(s) Oral at bedtime  enoxaparin Injectable 40 milliGRAM(s) SubCutaneous daily              
76 year old female with PMH of type 2 DM, CAD, CHF, dementia, HLD admitted with multivessel CAD s/p 3V CABG and LAD endarterectomy on 4/19.        1.  Type 2 DM-   Continue Lispro 4 units with meals.  Due to slight hyperglycemia this morning, consider increasing Lantus to 16 units qhs.  Would recommend discharge on basal bolus insulin regimen, so will need teaching.     2.  CAD-  s/p CABG.  Management as per CT surgery team.  Continue ASA, statin and metoprolol.  3.  HLD-  continue high dose statin therapy.  
76 year old female with PMH of type 2 DM, CAD, CHF, dementia, HLD admitted with multivessel CAD s/p 3V CABG and LAD endarterectomy on 4/19.  She has developed mild hyperglycemia today.      1.  Type 2 DM-   will increase Lispro to 4 units with meals.  Continue current dose of Lantus and sliding scale.  Would recommend discharge on basal bolus insulin regimen, so will need teaching.     2.  CAD-  s/p CABG.  Management as per CT surgery team.  Continue ASA, statin and metoprolol.  3.  HLD-  continue high dose statin therapy.    
76 year old female with PMH of type 2 DM, CAD, CHF, dementia, HLD admitted with multivessel CAD s/p 3V CABG and LAD endarterectomy on 4/19.        1.  Type 2 DM-   Due to hyperglycemia this morning, will increase dose of Lantus to 18 units qhs.  Recommend discharge on basal- bolus insulin regimen.   2.  CAD-  s/p CABG.  Management as per CT surgery team.  Continue ASA, statin and metoprolol.  3.  HLD-  continue high dose statin therapy.  
76 year old female with a medical history of type 2 DM (HA1C 9.4 on insulin at home), CAD, CHF, dementia, HTN, HLD, now s/p CABG X 3 (LIMA to LAD, SVG to RPDA, SVG to DIAG) with LAD endarterectomy and right SVG harvest on 4/19/21 with Dr. Valenzuela. Postoperative course complicated by FIDELINA (requiring amio gtt).   
76 year old female with a medical history of type 2 DM (HA1C 9.4 on insulin at home), CAD, CHF, dementia, HTN, HLD, now s/p CABG X 3 (LIMA to LAD, SVG to RPDA, SVG to DIAG) with LAD endarterectomy and right SVG harvest on 4/19/21 with Dr. Valenzuela. Postoperative course complicated by FIDELINA (requiring amio gtt, converted back to NSR, now transitioned to PO amio load).  On 4/23 patient noted to tata down and require pacing, medications adjusted as per EP recommendations. Still overnight patient intermittently bradycardic requiring backup pacing at 40bpm.   
76F  DM II A1C 9.4, CAD, CHF, dementia, hyperlipidemia, HTN 4/19 C3L, LAD Endarterectomy  
76 year old female with a medical history of type 2 DM (HA1C 9.4 on insulin at home), CAD, CHF, dementia, HTN, HLD, now s/p CABG X 3 (LIMA to LAD, SVG to RPDA, SVG to DIAG) with LAD endarterectomy and right SVG harvest on 4/19/21 with Dr. Valenzuela. Postoperative course complicated by FIDELINA (requiring amio gtt, converted back to NSR, now transitioned to PO amio load).  On 4/23 patient noted to tata down and require pacing, medications adjusted as per EP recommendations. Still overnight patient intermittently bradycardic requiring backup pacing at 40bpm.   4/25 Staples removed  EP following dysrhythmia   patient remains with pacing wires in place - VVI 40/02/0.8   threshold not checked as patient tried use pacer as a weapon     overnight significant for acute delirium requiring SQ haldol - at the time patient disoriented to place/ time - patient believed she was hypoglycemic - oreos given   gertrude rubin concerns of patients dementia/delirium   will rule out other causes of acute encephalopathy - will send ammonia level   
76 year old female with a medical history of type 2 DM (HA1C 9.4 on insulin at home), CAD, CHF, dementia, HTN, HLD, now s/p CABG X 3 (LIMA to LAD, SVG to RPDA, SVG to DIAG) with LAD endarterectomy and right SVG harvest on 4/19/21 with Dr. Valenzuela. Postoperative course complicated by FIDELINA (requiring amio gtt, converted back to NSR, now transitioned to PO amio load).   
76F  DM II A1C 9.4, CAD, CHF, dementia, hyperlipidemia, HTN  4/21: POD #2 C3L with LAD endarterectomy   
76 year old female with a medical history of type 2 DM (HA1C 9.4 on insulin at home), CAD, CHF, dementia, HTN, HLD, now s/p CABG X 3 (LIMA to LAD, SVG to RPDA, SVG to DIAG) with LAD endarterectomy and right SVG harvest on 4/19/21 with Dr. Valenzuela. Postoperative course complicated by FIDELINA (requiring amio gtt, converted back to NSR, now transitioned to PO amio load).  On 4/23 patient noted to tata down and require pacing, medications adjusted as per EP recommendations. Still overnight patient intermittently bradycardic requiring backup pacing at 40bpm.   4/25 Staples removed  EP following dysrhythmia

## 2021-04-26 NOTE — DISCHARGE NOTE PROVIDER - NSDCFUADDINST_GEN_ALL_CORE_FT
Wash incisions with soap and water using washcloth in shower daily,do not apply any lotion,powder,oil,sunscreen to any surgical incision  Please come to ED or Call Cardio thoracic office at [phone] if Chest pain, Shortness of Breath, persistant Nausea & vomiting, oozing from wounds,palpitations or pain not relieved with medication  Weigh yourself daily>notify surgeons office if  2 lb increase in weight in 24 hours.  1. Take ALL of your medications as ordered. Fill your prescriptions the day you are discharged and take according to the schedule you were given. Continue to take a stool softener if you are taking narcotic pain medications. AVOID medications such as ibuprofen or naproxen if you have had bypass surgery. If you have any questions or are unable to fill the prescriptions, please call the office right away at [phone].  2. Shower daily. Clean all incisions daily while showering with warm water and mild soap, pat dry with a clean towel and do not cover with any dressings unless instructed to. No bathing, swimming in a pool or the ocean until instructed by MD.  DO NOT use creams or lotions on the wound.  3. We advise that you do not drive until instructed by MD. Use your red pillow between chest and seatbelt to avoid injury in the event of a motor vehicle accident until you see the surgeon again in the office.  4. You may not return to work until instructed by MD.   5. Please eat a low fat, low cholesterol, low salt diet. (No added/extra salt). A nutritional supplement like Ensure or Glucerna (for diabetics) may help you reach your nutritional goals after surgery and aid in your recovery.  6. Weigh yourself every day in the morning and record it in the weight log in your red folder. Notify the office of any weight gain more than 2-3 pounds in 24 hours.  **Please LIMIT YOUR FLUID INTAKE TO ABOUT 4 8 OUNCE GLASSES OF BEVERAGE DAILY.**  7. Continue breathing exercises several times a day. Continue to use your heart pillow when coughing.  8. No heavy lifting nothing greater than 5 pounds until cleared by MD. We recommend that you sleep on your back and not your side or stomach for the next 4-6 weeks.  9. Call / Notify MD any fever greater than 101.0, any drainage from incisions or if they become red, hot or very tender to the touch.  10. Increase activity as tolerated. Walk indoors and/or outdoors at least 3 times a day.

## 2021-04-26 NOTE — CHART NOTE - NSCHARTNOTESELECT_GEN_ALL_CORE
CTS Addendum ACP note/Event Note
Event Note
Endocrinology Attending
Event Note

## 2021-04-26 NOTE — PROGRESS NOTE ADULT - PROBLEM SELECTOR PLAN 2
Postop afib noted, converted to NSR,   Follow up AM EKG.
Postop afib noted, converted to NSR, then bradycardia noted overnight with patient pacing at 40bpm appropriately on telemetry.   Follow up AM EKG.
Postop afib noted, converted to NSR,   Follow up AM EKG.  with intermittent pacing requirement overnight
consider antonina will discuss with Dr Valenzuela in am
Postop afib noted, now converted back to NSR, currently on an amio PO load.   Follow up AM EKG.
Cont lopressor   Add diuretic in AM
Postop afib noted overnight with patient currently on an amio gtt.

## 2021-04-26 NOTE — DISCHARGE NOTE PROVIDER - NSDCPNSUBOBJ_GEN_ALL_CORE
maura Note:   · Provider Specialty  CT Surgery    Reason for Admission:   Reason for Admission:  · Reason for Admission        · Subjective and Objective:   SUBJECTIVE   [place] speaking patient    used     INTERIM HISTORY SIGNIFICANT FOR   patient with intermittent confusion throughout the day - was able to be reoriented   acute delirium / combative episode with concern for patient safety    used at that time significant for patient believing we where in her home, she wanted to go to the kitchen to make food and thought she was hypoglycemic   finger stick was 126 at the time   ultimately patient calmed down by giving her OREOs - & haldol   FOR DAY PLAN PURPOSES PATIENT WAS NPO PENDING POSSIBLE PPM - PATIENT WITNESSED EATING 4xOREOS at 1245 and drank THREE SIPS OF WATER     Patient is a 76y old  Female who presents with a chief complaint of  (2021 11:23)    HPI:  75 y/o female presents to PST today. Patient reports a history of heart disease for about 20 years. Reports that she was told about a month ago that she had a myocardial infarction. As per son, Patient has about 3-4 nblocked vessels in the heart. Patient c/o chest pain  radiating through right upper back. Denies any fever, SOB, Chills. Scheduled for CABG x4 with Dr. Valenzuela 2021.  (2021 15:06)    OBJECTIVE  PAST MEDICAL & SURGICAL HISTORY:  CAD (coronary artery disease)  Type 2 diabetes mellitus  Hypertension  H/O myocardial ischemia  Hyperlipemia  At risk for sleep apnea  Bang 5  Congestive heart failure  Dementia  H/O:       No Known Allergies  OHS (Unknown)    Home Medications:  albuterol:  (2021 12:29)  aspirin 325 mg oral tablet: orally once a day (2021 12:29)  HumaLOG:  (2021 12:29)  isosorbide mononitrate: 60 milligram(s) orally (2021 12:29)  Lantus:  (2021 12:29)  Lasix 40 mg oral tablet: 1 tab(s) orally once a day (2021 12:29)  Lipitor 80 mg oral tablet: 1 tab(s) orally once a day (2021 12:29)  losartan: 100 milligram(s) orally once a day (2021 12:29)  Metoprolol Tartrate 50 mg oral tablet: 1 tab(s) orally 2 times a day (2021 12:29)    VITALS  ICU Vital Signs Last 24 Hrs  T(C): 36.9 (2021 21:17), Max: 37.7 (2021 10:24)  T(F): 98.5 (2021 21:17), Max: 99.8 (2021 10:24)  HR: 80 (2021 21:17) (63 - 88)  BP: 154/77 (2021 21:17) (123/62 - 169/99)  RR: 16 (2021 21:17) (16 - 18)  SpO2: 93% (2021 21:17) (93% - 100%)      LABS                        10.4   12.41 )-----------( 335      ( 2021 06:42 )             33.0   04-25    137  |  97<L>  |  26.0<H>  ----------------------------<  158<H>  4.2   |  28.0  |  0.77    Ca    9.1      2021 06:42  Phos  2.2     04-25  Mg     2.0     04-25    TPro  6.8  /  Alb  3.8  /  TBili  0.8  /  DBili  x   /  AST  14  /  ALT  13  /  AlkPhos  82  04-25  CAPILLARY BLOOD GLUCOSE      POCT Blood Glucose.: 126 mg/dL (2021 00:31)        IN/OUT    IMAGING  personally reviewed imaging   Xray Chest 1 View- PORTABLE-Routine:    EXAM:  XR CHEST PORTABLE ROUTINE 1V                          PROCEDURE DATE:  2021          INTERPRETATION:  Portable chest    CLINICAL INFORMATION:  Postoperative  Cardiac surgery.    TECHNIQUE:  Portable  AP view of the chest was obtained.    FINDINGS:   2021 chest available for review.  The lungs show residual LEFT upper lobe opacity with curvilinear inferior margin. LEFT upper lobe nodule cannot be excluded. Follow-up chest CT recommended.  No pneumothorax.  The heart and mediastinum are within normal limits following cardiac  surgery.    Visualized osseous structures are intact.        IMPRESSION:  Status post cardiac surgery.  LEFT UPPER LOBE OF RESIDUAL NODULE/MASS. FOLLOW-UP CHEST CT RECOMMENDED                HOLLAND MARIE MD; Attending Radiologist  This document has been electronically signed. 2021  3:41PM (21 @ 10:59)    CURRENT MEDICATIONS  MEDICATIONS  (STANDING):  aMIOdarone    Tablet 200 milliGRAM(s) Oral two times a day  amLODIPine   Tablet 5 milliGRAM(s) Oral daily  aspirin 325 milliGRAM(s) Oral daily  atorvastatin 80 milliGRAM(s) Oral at bedtime  clopidogrel Tablet 75 milliGRAM(s) Oral daily  furosemide    Tablet 40 milliGRAM(s) Oral daily  haloperidol    Injectable 2.5 milliGRAM(s) IntraMuscular once  insulin glargine Injectable (LANTUS) 18 Unit(s) SubCutaneous at bedtime  insulin lispro (ADMELOG) corrective regimen sliding scale   SubCutaneous Before meals and at bedtime  insulin lispro Injectable (ADMELOG) 4 Unit(s) SubCutaneous three times a day with meals  metoprolol tartrate 12.5 milliGRAM(s) Oral two times a day  pantoprazole    Tablet 40 milliGRAM(s) Oral daily  potassium chloride    Tablet ER 20 milliEquivalent(s) Oral daily  sertraline 50 milliGRAM(s) Oral daily  sodium chloride 0.9% lock flush 3 milliLiter(s) IV Push every 8 hours    MEDICATIONS  (PRN):  acetaminophen   Tablet .. 650 milliGRAM(s) Oral every 6 hours PRN Mild Pain (1 - 3)        Review of Systems:   · Additional ROS  UNABLE TO OBTAIN IN THE SETTING OF ACUTE COMBATIVE / UNCOOPERATIVE EPISODE    Physical Exam:   · Constitutional  detailed exam  · Constitutional Comments  COMBATIVE/DISTRESSED  · Extremities  detailed exam  · Extremities Details  no pedal edema  · Neurological  detailed exam  · Mental Status  BELIEVES SHE IS IN HER HOME  · Psychiatric  detailed exam  · Psychiatric Details  anxious; violent behavior  · Additional PE  LIMITED EXAMINATION PERFORMED IN THE SETTING OF ACUTE COMBATIVE EPISODE    Assessment and Plan:   · Assessment    76 year old female with a medical history of type 2 DM (HA1C 9.4 on insulin at home), CAD, CHF, dementia, HTN, HLD, now s/p CABG X 3 (LIMA to LAD, SVG to RPDA, SVG to DIAG) with LAD endarterectomy and right SVG harvest on 21 with Dr. Valenzuela. Postoperative course complicated by FIDELINA (requiring amio gtt, converted back to NSR, now transitioned to PO amio load).  On  patient noted to tata down and require pacing, medications adjusted as per EP recommendations. Still overnight patient intermittently bradycardic requiring backup pacing at 40bpm.    Staples removed  EP following dysrhythmia   patient remains with pacing wires in place - VVI /0.8   threshold not checked as patient tried use pacer as a weapon      PW cut at skin As per EP no indications PPM  Language barrier / cognitive impairment prohibits MCOT placement      Problem/Plan - 1:  ·  Problem: CAD (coronary artery disease).  Plan: S/p C3L with Dr. Valenzuela.   Postop afib noted, now converted back to NSR- intermittent pacing requirement on  PM  currently on amio 200 BID/ Lopressor 12.5 BID   Follow up AM EKG.   Trend H/H.   Continue BB as tolerated by HR and BP- will hold  AM   Continue ASA, plavix, and statin for graft patency.     Problem/Plan - 2:  ·  Problem: Atrial fibrillation.  Plan: Postop afib noted, converted to NSR,       Problem/Plan - 3:  ·  Problem: Congestive heart failure.  Plan: lopressor as tolerated - will hold  am   Continue to assess for the need of a diuretic- currently with mild edema - will continue lasix 40 at this time.       Problem/Plan - 5:  ·  Problem: Hyperlipemia.  Plan: Continue statin therapy for graft occlusion ppx.     Problem/Plan - 6:  Problem: Diabetes mellitus. Plan: Continue insulin for goal BS . BS remains controlled postoperatively.  FS AC/HS with WALDO, lantus and premeal insulin ordered.   Diabetic/consistent carb diet ordered.    Problem/Plan - 7:  ·  Problem: Dementia.  Plan: Avoid narcotics if possible- currently d/c   concern for ability to self inject insulin   after event on  early AM there is a possibility patient eats during acute dementia/ delirium episodes in which she believes her confusion is related to hypoglycemia - could explain high A1C - despite A1C would consider alternative method of glucose control.     Problem/Plan - 8:  ·  Problem: Prophylactic measure.  Plan: Lovenox and SCDs for DVT prophylaxis.  Protonix for GI prophylaxis.  Continue with bowel regimen PRN.     Plan to be discussed / reviewed with CTS attending / team during AM rounds.       Electronic Signatures:  Marlee Riley)  (Signed 2021 01:24)  	Authored: Progress Note, Reason for Admission, Subjective and Objective, Review of Systems, Physical Exam, Assessment and Plan      Last Updated: 2021 01:24 by Marlee Riley)

## 2021-04-26 NOTE — PROGRESS NOTE ADULT - PROBLEM SELECTOR PLAN 6
Continue insulin for goal BS . BS remains controlled postoperatively.  FS AC/HS with WALDO, lantus and premeal insulin ordered.   Diabetic/consistent carb diet ordered.
resume statin therapy for graft occlusion ppx
Continue insulin for goal BS . BS remains controlled postoperatively.  FS AC/HS with WALDO, lantus and premeal insulin ordered.   Diabetic/consistent carb diet ordered.
Continue insulin for goal BS . BS remains controlled postoperatively.  FS AC/HS with WALDO, lantus and premeal insulin ordered.   Diabetic/consistent carb diet ordered.
resume statin therapy for graft occlusion ppx
Continue insulin for goal BS . BS remains controlled postoperatively.  FS AC/HS with WALDO, lantus and premeal insulin ordered.   Diabetic/consistent carb diet ordered.
Continue insulin for goal BS . BS remains controlled postoperatively.  FS AC/HS with WALDO, lantus and premeal insulin ordered.   Diabetic/consistent carb diet ordered.

## 2021-04-26 NOTE — DISCHARGE NOTE PROVIDER - INSTRUCTIONS
A registered dietician can help you create a personalized plan for healthy eating. Make your calories count by choosin. Healthy carbohydrates such as fruits, vegetables, whole grains, legumes (beans, peas and lentils) and low-fat dairy products.  2. Fiber-rich foods such as vegetables, fruits, nuts, legumes, whole-wheat flour and wheat bran.  3. Heart-healthy fish at least twice a week such as cod, tuna and halibut, which are low-fat options   Minimize foods with high saturated fats (beef, hot dogs, sausage and mackey), trans fats (processed snacks, baked goods, shortening and stick margarines)

## 2021-04-26 NOTE — PROGRESS NOTE ADULT - PROBLEM SELECTOR PLAN 1
S/p C3L with Dr. Valenzuela.   Postop afib noted, now converted back to NSR, with sinus bradycardia and episodes of pacing noted last night after 200mg PO amio and 12.5mg PO Lopressor. Medications held this AM and to be further adjusted today.  Follow up AM EKG.   Trend H/H.   Continue BB as tolerated by HR and BP. Held this AM.   Continue ASA, plavix, and statin for graft patency.   Encourage CDBEs, incentive spirometry use, and increased ambulation with PT.   Follow up AM CXR.   Supplement electrolytes PRN.   No diuretics needed at this time.   Strict i/o's  Continue Pain control with oxy IR and tylenol PRN.   Continue bowel regimen PRN.
- Pain control with oxy ir and tylenol prn  - Encourage DBE/IS, wean NC as tolerated   - Daily CXR  - lopressor, hold for SBP less than 100 or HR less than 60    - Asa therapy for graft patency  - Statin therapy for chronic graft occlusion ppx   - Protonix for GI ppx  - Strict i/o's  - Chemical DVT ppx with lovenox, maintain SCD's for mechanical DVT ppx
S/p C3L with Dr. Valenzuela.   Postop afib noted, now converted back to NSR- intermittent pacing requirement on 4/25 PM  currently on amio 200 BID/ Lopressor 12.5 BID   Follow up AM EKG.   Trend H/H.   Continue BB as tolerated by HR and BP- will hold 4/26 AM   Continue ASA, plavix, and statin for graft patency.   Encourage CDBEs, incentive spirometry use, and increased ambulation with PT.   Follow up AM CXR.   Supplement electrolytes PRN.   Strict i/o's  Continue Pain control with tylenol PRN- oxy deleted secondary to confusion    Continue bowel regimen PRN.
S/p C3L with Dr. Valenzuela.   Postop afib noted, now converted back to NSR,   w/ Episodes sinus bradycardia and episodes of pacing 200mg PO amio and 12.5mg PO Lopressor. bid  Follow up AM EKG.   Trend H/H.   Continue BB as tolerated by HR and BP. Held this AM.   Continue ASA, plavix, and statin for graft patency.   Encourage CDBEs, incentive spirometry use, and increased ambulation with PT.   Follow up AM CXR.   Supplement electrolytes PRN.   cathartic  D/C staples  Strict i/o's  Continue Pain control with oxy IR and tylenol PRN.   Continue bowel regimen PRN.
- Pain control with oxy ir and tylenol prn  - Encourage DBE/IS, wean NC as tolerated   - Daily CXR  - Lopressor, hold for SBP less than 100 or HR less than 60    - Asa therapy for graft patency  - Statin therapy for chronic graft occlusion ppx   - Protonix for GI ppx  - Strict i/o's  - Chemical DVT ppx with lovenox, maintain SCD's for mechanical DVT ppx  Cont supportive care and discuss case in AM with team.
S/p C3L with Dr. Valenzuela.   Hemodynamically stable.   Postop afib noted overnight with patient currently on an amio gtt.   Trend H/H.   Continue BB as tolerated by HR and BP.   Continue ASA, plavix, and statin for graft patency.   Encourage CDBEs, incentive spirometry use, and increased ambulation with PT.   Follow up AM CXR.   Supplement electrolytes PRN.   No diuretics needed at this time.   Strict i/o's  Continue Pain control with oxy IR and tylenol PRN.   Continue bowel regimen PRN.
S/p C3L with Dr. Valenzuela.   Hemodynamically stable.   Postop afib noted, now converted back to NSR, currently on an amio PO load.   Follow up AM EKG.   Trend H/H.   Continue BB as tolerated by HR and BP.   Continue ASA, plavix, and statin for graft patency.   Encourage CDBEs, incentive spirometry use, and increased ambulation with PT.   Follow up AM CXR.   Supplement electrolytes PRN.   No diuretics needed at this time.   Strict i/o's  Continue Pain control with oxy IR and tylenol PRN.   Continue bowel regimen PRN.

## 2021-04-26 NOTE — DISCHARGE NOTE PROVIDER - NSDCMRMEDTOKEN_GEN_ALL_CORE_FT
acetaminophen 325 mg oral tablet: 2 tab(s) orally every 6 hours, As needed, Mild Pain (1 - 3)  amiodarone 200 mg oral tablet: 1 tab(s) orally once a day for 30 days only  amLODIPine 5 mg oral tablet: 1 tab(s) orally once a day  aspirin 325 mg oral tablet: orally once a day  clopidogrel 75 mg oral tablet: 1 tab(s) orally once a day  HumaLOG:   Lantus:   Lasix 40 mg oral tablet: 1 tab(s) orally once a day  Lipitor 80 mg oral tablet: 1 tab(s) orally once a day  potassium chloride 20 mEq oral tablet, extended release: 1 tab(s) orally once a day  Toprol-XL 25 mg oral tablet, extended release: 1 tab(s) orally once a day

## 2021-04-26 NOTE — PROGRESS NOTE ADULT - REASON FOR ADMISSION
s/p CABG
Admission following CABG
CAD
CABG
CAD, s/p CABG
4/19
CAD requiring elective heart surgery
CAD
4/19

## 2021-04-26 NOTE — PROGRESS NOTE ADULT - PROBLEM SELECTOR PLAN 5
continue lopressor
Continue statin therapy for graft occlusion ppx.
continue lopressor
Continue statin therapy for graft occlusion ppx.

## 2021-04-26 NOTE — DISCHARGE NOTE PROVIDER - NSDCCPCAREPLAN_GEN_ALL_CORE_FT
PRINCIPAL DISCHARGE DIAGNOSIS  Diagnosis: S/P CABG x 3  Assessment and Plan of Treatment: ASA plavix statin betablocker  diuretics  amio x 30 days rhythm regulation

## 2021-04-26 NOTE — PROGRESS NOTE ADULT - PROBLEM SELECTOR PLAN 3
Continue lopressor as tolerated by HR and BP.   Continue to assess for the need of a diuretic.
avoid narcotics If possible
Continue lopressor   Continue to assess for the need of a diuretic.
Continue lopressor as tolerated by HR and BP.   Continue to assess for the need of a diuretic.
lopressor as tolerated - will hold 4/26 am   Continue to assess for the need of a diuretic- currently with mild edema - will continue lasix 40 at this time
Continue lopressor as tolerated by HR and BP. Held this AM.   Continue to assess for the need of a diuretic.
avoid narcotics If possible

## 2021-04-26 NOTE — PROGRESS NOTE ADULT - PROBLEM SELECTOR PROBLEM 6
Hyperlipemia
Diabetes mellitus
Hyperlipemia

## 2021-04-26 NOTE — PROGRESS NOTE ADULT - PROVIDER SPECIALTY LIST ADULT
Cardiology
Endocrinology
Anesthesia
Endocrinology
Cardiology
Electrophysiology
Electrophysiology
Endocrinology
Endocrinology
CT Surgery

## 2021-04-26 NOTE — DISCHARGE NOTE PROVIDER - CARE PROVIDER_API CALL
Cory Valenzuela)  Surgery; Thoracic and Cardiac Surgery  [address] [place] [address]  Phone: [phone]  Fax: [phone]  Scheduled Appointment: 05/11/2021 10:00 AM    Joon Geiger)  Cardiology  [address] [place] [address]  Phone: [phone]  Fax: [phone]  Follow Up Time: 1 month

## 2021-04-26 NOTE — DISCHARGE NOTE PROVIDER - NSDCFUADDAPPT_GEN_ALL_CORE_FT
Please follow up with Dr. Valenzuela on   May 11 at 10 am  Cardiac surgery office located at [address] [facility] [facility]  Office telephone     Your Care Navigator Nurse Practitioner will be in touch to see you in your home within a few days from discharge. The Follow Your Heart program can help ensure you understand your medications, discharge instructions and answer any questions you may have at that time. They are also a great source to address concerns during the day and may be reached at [phone].

## 2021-04-26 NOTE — PROGRESS NOTE ADULT - NSICDXPILOT_GEN_ALL_CORE
[place]

## 2021-04-26 NOTE — PROGRESS NOTE ADULT - PROBLEM SELECTOR PROBLEM 2
Atrial fibrillation
Congestive heart failure
Atrial fibrillation
Congestive heart failure
Atrial fibrillation

## 2021-04-26 NOTE — PROGRESS NOTE ADULT - PROBLEM SELECTOR PLAN 7
Avoid narcotics if possible.
Avoid narcotics if possible.
Cont GI and DVT prophylaxis
Avoid narcotics if possible.
Avoid narcotics if possible- currently d/c   concern for ability to self inject insulin   after event on 4.26 early AM there is a possibility patient eats during acute dementia/ delirium episodes in which she believes her confusion is related to hypoglycemia - could explain high A1C - despite A1C would consider alternative method of glucose control
Avoid narcotics if possible.

## 2021-04-26 NOTE — PROGRESS NOTE ADULT - PROBLEM SELECTOR PLAN 8
Lovenox and SCDs for DVT prophylaxis.  Protonix for GI prophylaxis.  Continue with bowel regimen PRN.     Plan to be discussed / reviewed with CTS attending / team during AM rounds.

## 2021-04-26 NOTE — PROGRESS NOTE ADULT - PROBLEM SELECTOR PLAN 4
Continue lopressor as tolerated.
Continue lopressor as tolerated. Uptitrate dose as able.
Continue lopressor as tolerated. Uptitrate dose as able.
insulin protocol as per CT ICU
Continue lopressor as tolerated. Held this AM. Continue to monitor BP.
insulin protocol as per CT ICU
Continue lopressor as tolerated- will hold 4/26 am

## 2021-04-26 NOTE — DISCHARGE NOTE PROVIDER - HOSPITAL COURSE
76 year old female with a medical history of type 2 DM (HA1C 9.4 on insulin at home), CAD, CHF, dementia, HTN, HLD, now s/p CABG X 3 (LIMA to LAD, SVG to RPDA, SVG to DIAG) with LAD endarterectomy and right SVG harvest on 4/19/21 with Dr. Valenzuela. Postoperative course complicated by FIDELINA (requiring amio gtt, converted back to NSR, now transitioned to PO amio load).  On 4/23 patient noted to tata down and require pacing, medications adjusted as per EP recommendations. Still overnight patient intermittently bradycardic requiring backup pacing at 40bpm.   4/25 Staples removed  EP following dysrhythmia   patient remains with pacing wires in place - VVI 40/02/0.8   4/26 Home EP cleared for D/C no indication PPM Language barrier precludes benefit for [facility]OT

## 2021-04-26 NOTE — PROGRESS NOTE ADULT - PROBLEM SELECTOR PROBLEM 3
Congestive heart failure
Dementia
Congestive heart failure
Dementia
Congestive heart failure

## 2021-04-26 NOTE — PROGRESS NOTE ADULT - SUBJECTIVE AND OBJECTIVE BOX
CC:  follow up DM    Interval HPI/ Overnight Events:  Patient denies any current CP.      MEDICATIONS  (STANDING):  aMIOdarone    Tablet 200 milliGRAM(s) Oral two times a day  amLODIPine   Tablet 5 milliGRAM(s) Oral daily  aspirin 325 milliGRAM(s) Oral daily  atorvastatin 80 milliGRAM(s) Oral at bedtime  clopidogrel Tablet 75 milliGRAM(s) Oral daily  furosemide    Tablet 40 milliGRAM(s) Oral daily  glucagon  Injectable 1 milliGRAM(s) IntraMuscular once  insulin glargine Injectable (LANTUS) 16 Unit(s) SubCutaneous at bedtime  insulin lispro (ADMELOG) corrective regimen sliding scale   SubCutaneous Before meals and at bedtime  insulin lispro Injectable (ADMELOG) 4 Unit(s) SubCutaneous three times a day with meals  metoprolol tartrate 12.5 milliGRAM(s) Oral two times a day  pantoprazole    Tablet 40 milliGRAM(s) Oral daily  potassium chloride    Tablet ER 20 milliEquivalent(s) Oral daily  sertraline 50 milliGRAM(s) Oral daily  sodium chloride 0.9% lock flush 3 milliLiter(s) IV Push every 8 hours    Vital Signs Last 24 Hrs  T(C): 37.7 (25 Apr 2021 10:24), Max: 37.7 (25 Apr 2021 10:24)  T(F): 99.8 (25 Apr 2021 10:24), Max: 99.8 (25 Apr 2021 10:24)  HR: 83 (25 Apr 2021 12:00) (63 - 85)  BP: 123/62 (25 Apr 2021 10:24) (123/62 - 167/66)  RR: 16 (25 Apr 2021 10:24) (16 - 18)  SpO2: 100% (25 Apr 2021 10:24) (96% - 100%)    PE:  Gen: AAO, NAD  HEENT:  sclera anicteric,  MMM  Neck:  no thyromegaly, no LAD  CV:  nl S1 + S2, RRR, no m/r/g  Resp:  nl respiratory effort, slight crackles at b/l bases.  GI/ Abd: soft, NT/ ND, BS +  Neuro:  No tremor  MS:  no c/c/e, nl muscle tone  Skin:  no acanthosis  Psych: affect appropriate    LABS:  04-25    137  |  97<L>  |  26.0<H>  ----------------------------<  158<H>  4.2   |  28.0  |  0.77    Ca    9.1      25 Apr 2021 06:42  Phos  2.2     04-25  Mg     2.0     04-25    TPro  6.8  /  Alb  3.8  /  TBili  0.8  /  DBili  x   /  AST  14  /  ALT  13  /  AlkPhos  82  04-25                          10.4   12.41 )-----------( 335      ( 25 Apr 2021 06:42 )             33.0     CAPILLARY BLOOD GLUCOSE  POCT Blood Glucose.: 100 mg/dL (25 Apr 2021 12:21)  POCT Blood Glucose.: 270 mg/dL (25 Apr 2021 08:13)  POCT Blood Glucose.: 169 mg/dL (24 Apr 2021 21:43)  POCT Blood Glucose.: 259 mg/dL (24 Apr 2021 16:54)    
CC:  follow up DM    Interval HPI/ Overnight Events:  patient denies any associated chest pain.    MEDICATIONS  (STANDING):  aspirin 325 milliGRAM(s) Oral daily  atorvastatin 80 milliGRAM(s) Oral at bedtime  clopidogrel Tablet 75 milliGRAM(s) Oral daily  enoxaparin Injectable 40 milliGRAM(s) SubCutaneous daily  glucagon  Injectable 1 milliGRAM(s) IntraMuscular once  insulin glargine Injectable (LANTUS) 16 Unit(s) SubCutaneous at bedtime  insulin lispro (ADMELOG) corrective regimen sliding scale   SubCutaneous Before meals and at bedtime  insulin lispro Injectable (ADMELOG) 4 Unit(s) SubCutaneous three times a day with meals  metoprolol tartrate 25 milliGRAM(s) Oral two times a day  pantoprazole    Tablet 40 milliGRAM(s) Oral daily  sodium chloride 0.9% lock flush 3 milliLiter(s) IV Push every 8 hours    Vital Signs Last 24 Hrs  T(C): 37 (23 Apr 2021 08:07), Max: 37 (22 Apr 2021 15:45)  T(F): 98.6 (23 Apr 2021 08:07), Max: 98.6 (22 Apr 2021 15:45)  HR: 56 (23 Apr 2021 08:07) (45 - 71)  BP: 140/65 (23 Apr 2021 08:07) (109/62 - 154/75)  RR: 17 (23 Apr 2021 08:07) (16 - 18)  SpO2: 98% (23 Apr 2021 08:07) (95% - 100%)    PE:  Gen: elderly female, NAD  HEENT:  sclera anicteric, MMM  Neck:  no thyromegaly, no LAD  CV:  nl S1 + S2, RRR, no m/r/g  Resp:  nl respiratory effort, CTA b/l  GI/ Abd: soft, NT/ ND, BS +  Neuro:  No tremor   MS:    nl muscle tone  Skin:  no acanthosis  Psych:  affect appropriate    LABS:  04-23    143  |  104  |  40.0<H>  ----------------------------<  156<H>  3.7   |  27.0  |  0.88    Ca    8.5<L>      23 Apr 2021 06:19  Mg     2.3     04-23                          9.0    11.94 )-----------( 200      ( 23 Apr 2021 06:19 )             28.0     CAPILLARY BLOOD GLUCOSE  POCT Blood Glucose.: 157 mg/dL (23 Apr 2021 07:56)  POCT Blood Glucose.: 177 mg/dL (22 Apr 2021 21:16)  POCT Blood Glucose.: 133 mg/dL (22 Apr 2021 17:03)  POCT Blood Glucose.: 176 mg/dL (22 Apr 2021 12:04)  
CC:  follow up DM    Interval HPI/ overnight Events:  Developed Afib yesterday and started on Amiodarone.  Currently denies chest pain.    MEDICATIONS  (STANDING):  aMIOdarone    Tablet 400 milliGRAM(s) Oral every 8 hours  aMIOdarone Infusion 0.5 mG/Min (16.7 mL/Hr) IV Continuous <Continuous>  aspirin 325 milliGRAM(s) Oral daily  atorvastatin 80 milliGRAM(s) Oral at bedtime  clopidogrel Tablet 75 milliGRAM(s) Oral daily  enoxaparin Injectable 40 milliGRAM(s) SubCutaneous daily  glucagon  Injectable 1 milliGRAM(s) IntraMuscular once  insulin glargine Injectable (LANTUS) 12 Unit(s) SubCutaneous at bedtime  insulin lispro (ADMELOG) corrective regimen sliding scale   SubCutaneous Before meals and at bedtime  insulin lispro Injectable (ADMELOG) 4 Unit(s) SubCutaneous three times a day with meals  metoprolol tartrate 25 milliGRAM(s) Oral two times a day  pantoprazole    Tablet 40 milliGRAM(s) Oral daily  sodium chloride 0.9% lock flush 3 milliLiter(s) IV Push every 8 hours    Vital Signs Last 24 Hrs  T(C): 37.5 (22 Apr 2021 09:56), Max: 37.5 (22 Apr 2021 09:56)  T(F): 99.5 (22 Apr 2021 09:56), Max: 99.5 (22 Apr 2021 09:56)  HR: 63 (22 Apr 2021 09:56) (63 - 151)  BP: 123/70 (22 Apr 2021 09:56) (99/56 - 144/86)  RR: 16 (22 Apr 2021 09:56) (16 - 18)  SpO2: 96% (22 Apr 2021 09:56) (91% - 96%)    PE:  Gen: elderly female, NAD  HEENT:  sclera anicteric, MMM  Neck:  no thyromegaly, no LAD  CV:  nl S1 + S2, RRR, no m/r/g  Resp:  nl respiratory effort, CTA b/l  GI/ Abd: soft, NT/ ND, BS +  Neuro:  No tremor   MS:   nl muscle tone  Skin:  no acanthosis  Psych: affect appropriate    LABS:  04-22    142  |  102  |  38.0<H>  ----------------------------<  199<H>  4.1   |  27.0  |  0.92    Ca    8.7      22 Apr 2021 06:07  Mg     2.3     04-22                          9.5    13.80 )-----------( 164      ( 22 Apr 2021 06:07 )             30.5     CAPILLARY BLOOD GLUCOSE  POCT Blood Glucose.: 176 mg/dL (22 Apr 2021 12:04)  POCT Blood Glucose.: 223 mg/dL (22 Apr 2021 08:09)  POCT Blood Glucose.: 92 mg/dL (21 Apr 2021 21:13)  POCT Blood Glucose.: 205 mg/dL (21 Apr 2021 16:54)  
CC:  follow up DM    Interval HPI/ overnight Events:  Denies any CP.  Has been transferred out of ICU to floor.  Son is present at bedside today.      MEDICATIONS  (STANDING):  aspirin 325 milliGRAM(s) Oral daily  atorvastatin 80 milliGRAM(s) Oral at bedtime  chlorhexidine 0.12% Liquid 5 milliLiter(s) Oral Mucosa two times a day  clopidogrel Tablet 75 milliGRAM(s) Oral daily  enoxaparin Injectable 40 milliGRAM(s) SubCutaneous daily  glucagon  Injectable 1 milliGRAM(s) IntraMuscular once  insulin glargine Injectable (LANTUS) 12 Unit(s) SubCutaneous at bedtime  insulin lispro (ADMELOG) corrective regimen sliding scale   SubCutaneous Before meals and at bedtime  insulin lispro Injectable (ADMELOG) 2 Unit(s) SubCutaneous three times a day before meals  metoprolol tartrate 25 milliGRAM(s) Oral two times a day  pantoprazole    Tablet 40 milliGRAM(s) Oral daily  sodium chloride 0.9% lock flush 3 milliLiter(s) IV Push every 8 hours    Vital Signs Last 24 Hrs  T(C): 37 (21 Apr 2021 13:42), Max: 37.2 (20 Apr 2021 16:05)  T(F): 98.6 (21 Apr 2021 13:42), Max: 98.9 (20 Apr 2021 16:05)  HR: 76 (21 Apr 2021 13:42) (61 - 83)  BP: 144/86 (21 Apr 2021 13:42) (98/54 - 175/77)  BP(mean): 91 (21 Apr 2021 12:00) (71 - 113)  RR: 16 (21 Apr 2021 13:42) (12 - 32)  SpO2: 92% (21 Apr 2021 13:42) (88% - 100%)    PE:  Gen: AAO, NAD  HEENT:  sclera anicteric, MMM  Neck:  no thyromegaly, no LAD  CV:  nl S1 + S2, RRR, no m/r/g  Resp:  nl respiratory effort, CTA b/l  GI/ Abd: soft, NT/ ND, BS +  Neuro:  No tremor   MS:  no c/c/e, nl muscle tone  Skin:  no acanthosis  Psych:  affect appropriate    LABS:  04-21    142  |  101  |  24.0<H>  ----------------------------<  173<H>  4.4   |  26.0  |  0.88    Ca    9.0      21 Apr 2021 03:03  Mg     2.2     04-21                            10.0   17.56 )-----------( 150      ( 21 Apr 2021 03:03 )             32.1     CAPILLARY BLOOD GLUCOSE  POCT Blood Glucose.: 234 mg/dL (21 Apr 2021 12:16)  POCT Blood Glucose.: 181 mg/dL (21 Apr 2021 06:14)  POCT Blood Glucose.: 126 mg/dL (20 Apr 2021 20:54)  POCT Blood Glucose.: 105 mg/dL (20 Apr 2021 18:39)  POCT Blood Glucose.: 100 mg/dL (20 Apr 2021 17:11)  
Subjective: No specific complaints    TELE: Sinus rhythm, occasional PACS (now conducted)    MEDICATIONS  (STANDING):  aMIOdarone    Tablet 200 milliGRAM(s) Oral two times a day  amLODIPine   Tablet 5 milliGRAM(s) Oral daily  aspirin 325 milliGRAM(s) Oral daily  atorvastatin 80 milliGRAM(s) Oral at bedtime  clopidogrel Tablet 75 milliGRAM(s) Oral daily  furosemide    Tablet 40 milliGRAM(s) Oral daily  insulin glargine Injectable (LANTUS) 18 Unit(s) SubCutaneous at bedtime  insulin lispro (ADMELOG) corrective regimen sliding scale   SubCutaneous Before meals and at bedtime  insulin lispro Injectable (ADMELOG) 4 Unit(s) SubCutaneous three times a day with meals  metoprolol tartrate 12.5 milliGRAM(s) Oral two times a day  pantoprazole    Tablet 40 milliGRAM(s) Oral daily  potassium chloride    Tablet ER 20 milliEquivalent(s) Oral daily  sertraline 50 milliGRAM(s) Oral daily  sodium chloride 0.9% lock flush 3 milliLiter(s) IV Push every 8 hours    MEDICATIONS  (PRN):  acetaminophen   Tablet .. 650 milliGRAM(s) Oral every 6 hours PRN Mild Pain (1 - 3)      Allergies    No Known Allergies    Intolerances    OHS (Unknown)      Vital Signs Last 24 Hrs  T(C): 36.8 (26 Apr 2021 08:40), Max: 37.7 (25 Apr 2021 10:24)  T(F): 98.3 (26 Apr 2021 08:40), Max: 99.8 (25 Apr 2021 10:24)  HR: 87 (26 Apr 2021 08:40) (63 - 88)  BP: 157/76 (26 Apr 2021 08:40) (123/62 - 169/99)  BP(mean): --  RR: 17 (26 Apr 2021 08:40) (16 - 18)  SpO2: 96% (26 Apr 2021 08:40) (93% - 100%)    Physical Exam:  Constitutional: NAD, AAOx3  Cardiovascular: +S1S2 RRR  Pulmonary: CTA b/l, unlabored  GI: soft NTND +BS  Extremities: no pedal edema, +distal pulses b/l  Neuro: non focal, VALENCIA x4    LABS:                        10.5   13.41 )-----------( 355      ( 26 Apr 2021 06:06 )             32.9     04-26    140  |  98  |  29.0<H>  ----------------------------<  171<H>  4.2   |  30.0<H>  |  0.76    Ca    8.9      26 Apr 2021 06:06  Phos  2.2     04-25  Mg     2.1     04-26    TPro  6.6  /  Alb  3.7  /  TBili  0.7  /  DBili  x   /  AST  15  /  ALT  13  /  AlkPhos  73  04-26      RADIOLOGY & ADDITIONAL TESTS:  
No evidence of anesthesia-related complications.
Subjective: no c/o incisional pain at this time. Denies CP, SOB, palpitations, N/V, other c/o.    T(C): 36.7 (21 @ 00:09), Max: 37.2 (21 @ 05:35)  HR: 76 (21 @ 00:30) (62 - 80)  BP: 138/115 (21 @ 05:35) (138/115 - 138/115)  ABP: 154/64 (21 @ 00:30) (104/47 - 154/64)  ABP(mean): 95 (21 @ 00:30) (64 - 95)  RR: 17 (21 @ 00:30) (11 - 25)  SpO2: 100% (21 @ 00:30) (99% - 100%)  Wt(kg): --  CVP(mm Hg): 12 (21 @ 00:30) (5 - 17)  CO: --  CI: --  PA: --   Mode: CPAP with PS  FiO2: 40  PEEP: 5  PS: 10      I&O's Detail    2021 07:01  -  2021 01:03  --------------------------------------------------------  IN:    Albumin 5%  - 250 mL: 750 mL    Insulin: 31.5 mL    IV PiggyBack: 100 mL    IV PiggyBack: 200 mL    IV PiggyBack: 100 mL    IV PiggyBack: 250 mL    Norepinephrine: 20 mL    PRBCs (Packed Red Blood Cells): 300 mL    sodium chloride 0.9%: 130 mL    sodium chloride 0.9%: 65 mL  Total IN: 1946.5 mL    OUT:    Chest Tube (mL): 470 mL    Chest Tube (mL): 125 mL    Indwelling Catheter - Urethral (mL): 1300 mL  Total OUT: 1895 mL    Total NET: 51.5 mL          LABS: All Lab data reviewed and analyzed                        9.5    14.83 )-----------( 158      ( 2021 12:51 )             29.1         141  |  105  |  22.0<H>  ----------------------------<  197<H>  4.2   |  23.0  |  0.69    Ca    8.7      2021 12:51  Mg     3.2         TPro  5.4<L>  /  Alb  3.9  /  TBili  0.5  /  DBili  x   /  AST  22  /  ALT  8   /  AlkPhos  47      PT/INR - ( 2021 12:51 )   PT: 14.6 sec;   INR: 1.27 ratio           CARDIAC MARKERS ( 2021 12:51 )  CKx     / CKMBx     / Troponin T0.35 ng/mL /      ABG - ( 2021 19:39 )  pH, Arterial: 7.30  pH, Blood: x     /  pCO2: 48    /  pO2: 150   / HCO3: 22    / Base Excess: -3.1  /  SaO2: 100               CAPILLARY BLOOD GLUCOSE      POCT Blood Glucose.: 118 mg/dL (2021 00:39)  POCT Blood Glucose.: 132 mg/dL (2021 23:29)  POCT Blood Glucose.: 148 mg/dL (2021 22:34)  POCT Blood Glucose.: 153 mg/dL (2021 21:30)  POCT Blood Glucose.: 167 mg/dL (2021 19:37)  POCT Blood Glucose.: 168 mg/dL (2021 18:57)  POCT Blood Glucose.: 169 mg/dL (2021 18:05)  POCT Blood Glucose.: 174 mg/dL (2021 16:59)  POCT Blood Glucose.: 160 mg/dL (2021 16:06)  POCT Blood Glucose.: 154 mg/dL (2021 15:02)  POCT Blood Glucose.: 157 mg/dL (2021 14:06)  POCT Blood Glucose.: 179 mg/dL (2021 12:58)  POCT Blood Glucose.: 145 mg/dL (2021 07:07)           RADIOLOGY: - Reviewed and analyzed   CXR: pending    HOSPITAL MEDICATIONS: All medications reviewed and analyzed  MEDICATIONS  (STANDING):  aspirin 325 milliGRAM(s) Oral daily  cefuroxime  IVPB 1500 milliGRAM(s) IV Intermittent every 8 hours  chlorhexidine 0.12% Liquid 5 milliLiter(s) Oral Mucosa two times a day  clopidogrel Tablet 75 milliGRAM(s) Oral daily  dextrose 50% Injectable 50 milliLiter(s) IV Push every 15 minutes  dextrose 50% Injectable 25 milliLiter(s) IV Push every 15 minutes  insulin regular Infusion 2 Unit(s)/Hr (2 mL/Hr) IV Continuous <Continuous>  metoprolol tartrate 25 milliGRAM(s) Oral two times a day  norepinephrine Infusion 0.05 MICROgram(s)/kG/Min (4.8 mL/Hr) IV Continuous <Continuous>  oxyCODONE    IR 10 milliGRAM(s) Oral every 4 hours  pantoprazole    Tablet 40 milliGRAM(s) Oral daily  potassium chloride  10 mEq/50 mL IVPB 10 milliEquivalent(s) IV Intermittent every 1 hour  potassium chloride  10 mEq/50 mL IVPB 10 milliEquivalent(s) IV Intermittent every 1 hour  potassium chloride  10 mEq/50 mL IVPB 10 milliEquivalent(s) IV Intermittent every 1 hour  sodium chloride 0.9%. 1000 milliLiter(s) (10 mL/Hr) IV Continuous <Continuous>  sodium chloride 0.9%. 1000 milliLiter(s) (5 mL/Hr) IV Continuous <Continuous>  vancomycin  IVPB 750 milliGRAM(s) IV Intermittent every 12 hours    MEDICATIONS  (PRN):  acetaminophen   Tablet .. 650 milliGRAM(s) Oral every 6 hours PRN Mild Pain (1 - 3)  oxyCODONE    IR 5 milliGRAM(s) Oral every 4 hours PRN Moderate Pain (4 - 6)          Lines: right IJ intro and right radial claudio     Physical Exam  Neuro: A+O x 3, non-focal, speech clear and intact  HEENT:  NCAT, PERRL, EOMI. No conjuctival edema or iIcterus, no thrush.  No ETT or NGT/OGT  Neck:  ROM intact, no JVD, no nodes or masses palpable, trachea midline, no tracheostomy  Pulm: CTA, good air entry, equal bilaterally, no rales/rhonchi/wheezing, no accessory muscle use noted  Chest:        mediastinal CT and left pleural CT all with dressings intact and no air leak, no subQ emphysema       +PW attatched to pacing box  CV: RRR, S1, S2. No murmurs, rubs, or gallops noted.  Abd: +BSx4. Soft, nontender, nondistended.  : orellana in situ to bedside drainage  Ext: VALENCIA x 4, no edema, no cyanosis or clubbing, distal motor/neuro/circ intact  Skin: warm, dry, no rashes  Incisions: midsternal C/D/I/stable w/ dressing        Case including assessment/plan of care discussed with   CT surgery attending.  Critical Care time:  35   minutes of noncontinuos critical care time spent evaluating/treating, reviewing imaging, labs, discussing case with multidisciplinary team, discussing plans/goals of care with patient/family to prevent further life threatening depreciation of the patient's condition. Non-inclusive is procedure time.       76yFemale with PMH     CABG, 1 arterial and 2 venous    Coronary endarterectomy        PAST MEDICAL & SURGICAL HISTORY:  CAD (coronary artery disease)    Type 2 diabetes mellitus    Hypertension    H/O myocardial ischemia    Hyperlipemia    At risk for sleep apnea  Bang 5    Congestive heart failure    Dementia    H/O:     
                                                  CARDIOLOGY PROGRESS NOTE   ([facility])                                                                                                        Subjective:  alert, awake, nad, denied cp    Vitals:  T(C): 36.8 (04-21-21 @ 15:12), Max: 37 (04-21-21 @ 00:10)  HR: 74 (04-21-21 @ 17:16) (61 - 83)  BP: 110/53 (04-21-21 @ 17:16) (98/54 - 175/77)  RR: 18 (04-21-21 @ 17:16) (12 - 28)  SpO2: 91% (04-21-21 @ 17:16) (89% - 100%)  Wt(kg): --  I&O's Summary    20 Apr 2021 07:01  -  21 Apr 2021 07:00  --------------------------------------------------------  IN: 1077.5 mL / OUT: 2395 mL / NET: -1317.5 mL    21 Apr 2021 07:01  -  21 Apr 2021 18:20  --------------------------------------------------------  IN: 480 mL / OUT: 440 mL / NET: 40 mL          PHYSICAL EXAM:  unwilling to have physical exam today  According no nursing staff pt wants only female staff  : 	      CURRENT MEDICATIONS:  metoprolol tartrate 25 milliGRAM(s) Oral two times a day        acetaminophen   Tablet .. 650 milliGRAM(s) Oral every 6 hours PRN  aspirin 325 milliGRAM(s) Oral daily  oxyCODONE    IR 5 milliGRAM(s) Oral every 4 hours PRN  oxyCODONE    IR 10 milliGRAM(s) Oral every 4 hours PRN    pantoprazole    Tablet 40 milliGRAM(s) Oral daily    atorvastatin 80 milliGRAM(s) Oral at bedtime  glucagon  Injectable 1 milliGRAM(s) IntraMuscular once  insulin glargine Injectable (LANTUS) 12 Unit(s) SubCutaneous at bedtime  insulin lispro (ADMELOG) corrective regimen sliding scale   SubCutaneous Before meals and at bedtime  insulin lispro Injectable (ADMELOG) 4 Unit(s) SubCutaneous three times a day with meals    chlorhexidine 0.12% Liquid 5 milliLiter(s) Oral Mucosa two times a day  clopidogrel Tablet 75 milliGRAM(s) Oral daily  enoxaparin Injectable 40 milliGRAM(s) SubCutaneous daily  sodium chloride 0.9% lock flush 3 milliLiter(s) IV Push every 8 hours      LABS:	 	                              10.0   17.56 )-----------( 150      ( 21 Apr 2021 03:03 )             32.1     04-21    142  |  101  |  24.0<H>  ----------------------------<  173<H>  4.4   |  26.0  |  0.88    Ca    9.0      21 Apr 2021 03:03  Mg     2.2     04-21          
                                                  CARDIOLOGY PROGRESS NOTE   ([facility])                                                                                                        Subjective: extubated already. sitting in the chair. alert. nad.     Vitals:  T(C): 37.2 (04-20-21 @ 08:00), Max: 37.2 (04-20-21 @ 08:00)  HR: 67 (04-20-21 @ 16:00) (64 - 79)  BP: 132/63 (04-20-21 @ 16:00) (115/56 - 132/63)  RR: 30 (04-20-21 @ 16:00) (10 - 53)  SpO2: 90% (04-20-21 @ 16:00) (90% - 100%)  Wt(kg): --  I&O's Summary    19 Apr 2021 07:01  -  20 Apr 2021 07:00  --------------------------------------------------------  IN: 2042 mL / OUT: 2720 mL / NET: -678 mL    20 Apr 2021 07:01  -  20 Apr 2021 17:24  --------------------------------------------------------  IN: 997.5 mL / OUT: 430 mL / NET: 567.5 mL          PHYSICAL EXAM:  Appearance: Normal	  HEENT:   Atraumatic  Cardiovascular: Normal S1 S2,   Respiratory: Lungs clear to auscultation	  Gastrointestinal:  Soft, Non-tender, + BS	  Skin: No rashes, No ecchymoses, No cyanosis  Neurologic: Alert and awake, able to move extremities  Extremities: No edema        CURRENT MEDICATIONS:  metoprolol tartrate 25 milliGRAM(s) Oral two times a day    cefuroxime  IVPB 1500 milliGRAM(s) IV Intermittent every 8 hours  vancomycin  IVPB 750 milliGRAM(s) IV Intermittent every 12 hours      acetaminophen   Tablet .. 650 milliGRAM(s) Oral every 6 hours PRN  aspirin 325 milliGRAM(s) Oral daily  oxyCODONE    IR 5 milliGRAM(s) Oral every 4 hours PRN  oxyCODONE    IR 10 milliGRAM(s) Oral every 4 hours PRN    pantoprazole    Tablet 40 milliGRAM(s) Oral daily    atorvastatin 80 milliGRAM(s) Oral at bedtime  glucagon  Injectable 1 milliGRAM(s) IntraMuscular once  insulin lispro Injectable (ADMELOG) 2 Unit(s) SubCutaneous three times a day before meals  insulin regular Infusion 2 Unit(s)/Hr IV Continuous <Continuous>    chlorhexidine 0.12% Liquid 5 milliLiter(s) Oral Mucosa two times a day  clopidogrel Tablet 75 milliGRAM(s) Oral daily  enoxaparin Injectable 40 milliGRAM(s) SubCutaneous daily  sodium chloride 0.9%. 1000 milliLiter(s) IV Continuous <Continuous>  sodium chloride 0.9%. 1000 milliLiter(s) IV Continuous <Continuous>      LABS:	 	                            10.1   14.54 )-----------( 133      ( 20 Apr 2021 02:59 )             30.6     04-20    144  |  109<H>  |  18.0  ----------------------------<  127<H>  4.5   |  24.0  |  0.76    Ca    8.8      20 Apr 2021 02:59  Mg     2.3     04-20    TPro  5.4<L>  /  Alb  3.9  /  TBili  0.5  /  DBili  x   /  AST  22  /  ALT  8   /  AlkPhos  47  04-19      TSH: Thyroid Stimulating Hormone, Serum: 2.75 uIU/mL (04-14 @ 18:58)      
  Patient seen today in bed. Patient mostly speaks Vietnamese but understands some basic English. Overnight telemetry reviewed.     EKG: SR at 73bpm; QRSD 120ms; RBBB possible LPHB; QT 440ms manually measured  TELE: Occasional pacing at 4AM. Appears to be fusion. Sinus tata during sleeping hours. Rapid atrial fibrillation with rates up to 145-150bpm with termination without offset pause.     MEDICATIONS  (STANDING):  aMIOdarone    Tablet 200 milliGRAM(s) Oral two times a day  amLODIPine   Tablet 5 milliGRAM(s) Oral daily  aspirin 325 milliGRAM(s) Oral daily  atorvastatin 80 milliGRAM(s) Oral at bedtime  clopidogrel Tablet 75 milliGRAM(s) Oral daily  enoxaparin Injectable 40 milliGRAM(s) SubCutaneous daily  furosemide    Tablet 40 milliGRAM(s) Oral daily  glucagon  Injectable 1 milliGRAM(s) IntraMuscular once  insulin glargine Injectable (LANTUS) 16 Unit(s) SubCutaneous at bedtime  insulin lispro (ADMELOG) corrective regimen sliding scale   SubCutaneous Before meals and at bedtime  insulin lispro Injectable (ADMELOG) 4 Unit(s) SubCutaneous three times a day with meals  metoprolol tartrate 12.5 milliGRAM(s) Oral two times a day  pantoprazole    Tablet 40 milliGRAM(s) Oral daily  potassium chloride    Tablet ER 40 milliEquivalent(s) Oral once  sertraline 50 milliGRAM(s) Oral daily  sodium chloride 0.9% lock flush 3 milliLiter(s) IV Push every 8 hours    MEDICATIONS  (PRN):  acetaminophen   Tablet .. 650 milliGRAM(s) Oral every 6 hours PRN Mild Pain (1 - 3)  oxyCODONE    IR 5 milliGRAM(s) Oral every 4 hours PRN Moderate Pain (4 - 6)  oxyCODONE    IR 10 milliGRAM(s) Oral every 4 hours PRN Severe Pain (7 - 10)    Allergies  No Known Allergies    PAST MEDICAL & SURGICAL HISTORY:  CAD (coronary artery disease)  Type 2 diabetes mellitus  Hypertension  H/O myocardial ischemia  Hyperlipemia  At risk for sleep apnea  Bang 5  Congestive heart failure  Dementia  H/O:     Vital Signs Last 24 Hrs  T(C): 36.8 (2021 08:00), Max: 37.5 (2021 16:11)  T(F): 98.2 (2021 08:00), Max: 99.5 (2021 16:11)  HR: 73 (2021 09:35) (40 - 145)  BP: 168/77 (2021 08:00) (130/76 - 181/73)  RR: 16 (2021 09:35) (16 - 18)  SpO2: 96% (2021 08:00) (95% - 98%)    Physical Exam:  Constitutional: NAD alert and talkative  Cardiovascular: +S1S2 RRR. SR at time of exam. Sternotomy dressing CDI  Pulmonary: CTA b/l, unlabored  GI: soft NTND +BS  Extremities: no pedal edema,   Neuro: non focal, VALENCIA x4    LABS:                        8.5    11.72 )-----------( 231      ( 2021 01:25 )             26.6     141  |  104  |  37.0<H>  ----------------------------<  117<H>  3.5   |  27.0  |  0.83  Ca    8.6      2021 01:25  Phos  2.7     04-24  Mg     2.3     04-24    A/P  76 year old female with a history HTN, DM and CAD s/p 3v CABG on 2021. Post-op course complicated by atrial fibrillation with RVR. CHADSVASC: 5. Was given IV amiodarone which resulted in conversion to sinus rhythm.     Some overnight pacing requirement during sleeping hours. Rapid AFib in the mid 140s. RBBB conduction    Reviewed telemetry. Pauses appear to be secondary to non-conducted (blocked) PACs. This is not true tachy-tata syndrome.     - Remains on Amiodarone 200 mg bid for 3 days, then 200 daily  - Metoprolol 12.5 mg bid - May be reasonable to increase to 25mg to determine if pacing will be needed long term  - Continue to monitor on telemetry  - 30 day event monitor vs pacemaker prior to discharge.     
  POD # 4 s/p CABG X 3 (LIMA to LAD, SVG to RPDA, SVG to DIAG) with LAD endarterectomy and right SVG harvest    PAST MEDICAL & SURGICAL HISTORY:  CAD (coronary artery disease)  Type 2 diabetes mellitus  Hypertension  H/O myocardial ischemia  Hyperlipemia  At risk for sleep apnea, Bang 5  Congestive heart failure  Dementia  H/O:     FAMILY HISTORY:  FH: heart disease (Sibling)  FH: diabetes mellitus (Sibling, Child)    Brief Hospital Course: 76 year old female with a medical history of type 2 DM (HA1C 9.4 on insulin at home), CAD, CHF, dementia, HTN, HLD, now s/p CABG X 3 (LIMA to LAD, SVG to RPDA, SVG to DIAG) with LAD endarterectomy and right SVG harvest on 21 with Dr. Valenzuela. Postoperative course complicated by FIDELINA (requiring amio gtt, converted back to NSR, now transitioned to PO amio load).     Significant recent/past 24 hr events: No issues reported overnight.     Subjective: Patient sitting up in bed then walking around her room throughout encounter in no acute distress. Aggravated with the telemetry monitor, constantly taking it off and wrapping it up to put away. +Passed a small BM yesterday. +Passing flatus. +Tolerating diet. +Pain controlled. Denies fevers, chills, lightheadedness, dizziness, HA, CP, palpitations, SOB, cough, abdominal pain, N/V, diarrhea, numbness/tingling in extremities, or any other acute complaints.    MEDICATIONS  (STANDING):  aMIOdarone    Tablet 400 milliGRAM(s) Oral every 8 hours  aspirin 325 milliGRAM(s) Oral daily  atorvastatin 80 milliGRAM(s) Oral at bedtime  clopidogrel Tablet 75 milliGRAM(s) Oral daily  enoxaparin Injectable 40 milliGRAM(s) SubCutaneous daily  glucagon  Injectable 1 milliGRAM(s) IntraMuscular once  insulin glargine Injectable (LANTUS) 16 Unit(s) SubCutaneous at bedtime  insulin lispro (ADMELOG) corrective regimen sliding scale   SubCutaneous Before meals and at bedtime  insulin lispro Injectable (ADMELOG) 4 Unit(s) SubCutaneous three times a day with meals  metoprolol tartrate 25 milliGRAM(s) Oral two times a day  pantoprazole    Tablet 40 milliGRAM(s) Oral daily  sodium chloride 0.9% lock flush 3 milliLiter(s) IV Push every 8 hours    MEDICATIONS  (PRN):  acetaminophen   Tablet .. 650 milliGRAM(s) Oral every 6 hours PRN Mild Pain (1 - 3)  oxyCODONE    IR 5 milliGRAM(s) Oral every 4 hours PRN Moderate Pain (4 - 6)  oxyCODONE    IR 10 milliGRAM(s) Oral every 4 hours PRN Severe Pain (7 - 10)    Allergies: No Known Allergies    Vitals   T(C): 36.9 (2021 20:00), Max: 37.5 (2021 09:56)  T(F): 98.4 (2021 20:00), Max: 99.5 (2021 09:56)  HR: 67 (2021 20:00) (63 - 149)  BP: 131/67 (2021 20:00) (104/60 - 154/75)  RR: 18 (2021 20:00) (16 - 18)  SpO2: 96% (2021 20:00) (94% - 97%)    I&O's Detail    2021 07:01  -  2021 07:00  --------------------------------------------------------  IN:    Amiodarone: 166.5 mL    IV PiggyBack: 250 mL    IV PiggyBack: 50 mL    Oral Fluid: 660 mL  Total IN: 1126.5 mL    OUT:    Chest Tube (mL): 10 mL    Chest Tube (mL): 20 mL    Indwelling Catheter - Urethral (mL): 70 mL    Voided (mL): 640 mL  Total OUT: 740 mL    Total NET: 386.5 mL      2021 07:01  -  2021 01:54  --------------------------------------------------------  IN:    Amiodarone: 33.3 mL    Amiodarone: 83.5 mL    Oral Fluid: 800 mL  Total IN: 916.8 mL    OUT:    Voided (mL): 400 mL  Total OUT: 400 mL    Total NET: 516.8 mL    Physical Exam  Neuro: A+O x 2, non-focal, speech clear and intact, [place]  used throughout encounter  HEENT:  NCAT, PERRL, EOMI. No conjuctival edema or icterus, no thrush.    Neck:  Supple, trachea midline  Pulm: +Diminished BSs at bases b/l, no accessory muscle use noted  Chest: +PW (settings: VVI, rate: 45, mA: 7, sensitivity: 0.8)  CV: RRR, +S1S2  Abd: soft, NT, ND, + BS  Ext: VALENCIA x 4, no edema, no cyanosis or clubbing, distal motor/neuro/circ intact  Skin: warm, dry, well perfused  Incisions: midsternal mepilex C/D/I, sternum stable, RLE C/D/I    LABS                        9.5    13.80 )-----------( 164      ( 2021 06:07 )             30.5         142  |  102  |  38.0<H>  ----------------------------<  199<H>  4.1   |  27.0  |  0.92    Ca    8.7      2021 06:07  Mg     2.3         CARDIAC MARKERS ( 2021 06:07 )  CKx     / CKMBx     / Troponin T0.27 ng/mL /    POCT Blood Glucose.: 177 mg/dL (21 @ 21:16)  POCT Blood Glucose.: 133 mg/dL (21 @ 17:03)  POCT Blood Glucose.: 176 mg/dL (21 @ 12:04)  POCT Blood Glucose.: 223 mg/dL (21 @ 08:09)      Last CXR:  < from: Xray Chest 1 View- PORTABLE-Urgent (Xray Chest 1 View- PORTABLE-Urgent .) (21 @ 13:04) >  Two views of the chest are compared to 8 hours prior and demonstrate that the left chest tube has been removed, no pneumothorax.  Platelike atelectasis in the left lower lobe and left upper lobe patchy consolidation, unchanged.  Mild cardiomegaly and normal pulmonary vasculature with no gross consolidation, effusion or acute osseous finding.  Post sternotomy/CABG..  IMPRESSION:  Left chest tube removed, no pneumothorax.  Airspace disease in the left upper lobe and platelike atelectasis in the left lower lobe, grossly unchanged  < end of copied text >  
Subjective: " Hello,  I have a little pain"  OOB walking in [place]    Interviewed / plan reviewed with language services # [id number]  Tele:  SR [facility], brijesh Mobitz 1           V/S:                    T(F): 99.8 (21 @ 10:24), Max: 99.8 (21 @ 10:24)  HR: 78 (21 @ 10:24) (63 - 85)  BP: 123/62 (21 @ 10:24) (123/62 - 167/66)  RR: 16 (21 @ 10:24) (16 - 18)  SpO2: 100% (21 @ 10:24) (96% - 100%)  Wt(kg): --      LV EF:      Labs:      137  |  97<L>  |  26.0<H>  ----------------------------<  158<H>  4.2   |  28.0  |  0.77    Ca    9.1      2021 06:42  Phos  2.2       Mg     2.0         TPro  6.8  /  Alb  3.8  /  TBili  0.8  /  DBili  x   /  AST  14  /  ALT  13  /  AlkPhos  82                                 10.4   12.41 )-----------( 335      ( 2021 06:42 )             33.0             CAPILLARY BLOOD GLUCOSE      POCT Blood Glucose.: 270 mg/dL (2021 08:13)  POCT Blood Glucose.: 169 mg/dL (2021 21:43)  POCT Blood Glucose.: 259 mg/dL (2021 16:54)  POCT Blood Glucose.: 132 mg/dL (2021 12:12)           CXR: no PVC    I&O's Detail      EPICARDIAL WIRES:  [ x] YES [ ] NO      BOWEL MOVEMENT:  [ ] YES [x ] NO  x 3 days reported    Daily     Daily   Medications:  acetaminophen   Tablet .. 650 milliGRAM(s) Oral every 6 hours PRN  aMIOdarone    Tablet 200 milliGRAM(s) Oral two times a day  amLODIPine   Tablet 5 milliGRAM(s) Oral daily  aspirin 325 milliGRAM(s) Oral daily  atorvastatin 80 milliGRAM(s) Oral at bedtime  clopidogrel Tablet 75 milliGRAM(s) Oral daily  enoxaparin Injectable 40 milliGRAM(s) SubCutaneous daily  furosemide    Tablet 40 milliGRAM(s) Oral daily  glucagon  Injectable 1 milliGRAM(s) IntraMuscular once  insulin glargine Injectable (LANTUS) 16 Unit(s) SubCutaneous at bedtime  insulin lispro (ADMELOG) corrective regimen sliding scale   SubCutaneous Before meals and at bedtime  insulin lispro Injectable (ADMELOG) 4 Unit(s) SubCutaneous three times a day with meals  metoprolol tartrate 12.5 milliGRAM(s) Oral two times a day  oxyCODONE    IR 5 milliGRAM(s) Oral every 4 hours PRN  oxyCODONE    IR 10 milliGRAM(s) Oral every 4 hours PRN  pantoprazole    Tablet 40 milliGRAM(s) Oral daily  potassium chloride    Tablet ER 20 milliEquivalent(s) Oral daily  sertraline 50 milliGRAM(s) Oral daily  sodium chloride 0.9% lock flush 3 milliLiter(s) IV Push every 8 hours  sorbitol 70% Solution 30 milliLiter(s) Oral once        Physical Exam:    Neuro: A+O x 2, non-focal, speech clear and intact    Pulm: +Diminished BSs at bases b/l, no accessory muscle use noted    Chest: +PW (settings: VVI, rate: 40, mA: 10, threshold 5)    CV: RRR, +S1S2    Abd: soft, NT, ND, + BS reports no BM x 3 days    Ext: VALENCIA x 4, no edema, no cyanosis or clubbing, distal motor/neuro/circ intact    Incisions: midsternal mepilex C/D/I, sternum stable, RLE C/D/I > staples removed             PAST MEDICAL & SURGICAL HISTORY:  CAD (coronary artery disease)    Type 2 diabetes mellitus    Hypertension    H/O myocardial ischemia    Hyperlipemia    At risk for sleep apnea  Bang 5    Congestive heart failure    Dementia    H/O:               
  POD # 5 s/p CABG X 3 (LIMA to LAD, SVG to RPDA, SVG to DIAG) with LAD endarterectomy and right SVG harvest    PAST MEDICAL & SURGICAL HISTORY:  CAD (coronary artery disease)  Type 2 diabetes mellitus  Hypertension  H/O myocardial ischemia  Hyperlipemia  At risk for sleep apnea, Bang 5  Congestive heart failure  Dementia  H/O:     FAMILY HISTORY:  FH: heart disease (Sibling)  FH: diabetes mellitus (Sibling, Child)    Brief Hospital Course: 76 year old female with a medical history of type 2 DM (HA1C 9.4 on insulin at home), CAD, CHF, dementia, HTN, HLD, now s/p CABG X 3 (LIMA to LAD, SVG to RPDA, SVG to DIAG) with LAD endarterectomy and right SVG harvest on 21 with Dr. Valenzuela. Postoperative course complicated by FIDELINA (requiring amio gtt, converted back to NSR, now transitioned to PO amio load).  On  patient noted to tata down and require pacing, medications adjusted as per EP recommendations. Still overnight patient intermittently bradycardic requiring backup pacing at 40bpm.     Significant recent/past 24 hr events: Patient noted to tata down and require backup pacing at 40 a few times on telemetry during the night. Amiodarone and lopressor held this AM, dosing to be discussed with Surgeon and EP.     Subjective: Patient lying in bed in no acute distress. Asymptomatic, maintaining adequate BP while bradycardic +BM since surgery. +Tolerating diet. +Pain controlled. Denies fevers, chills, lightheadedness, dizziness, HA, CP, palpitations, SOB, cough, abdominal pain, N/V, diarrhea, numbness/tingling in extremities, or any other acute complaints.    MEDICATIONS  (STANDING):  aMIOdarone    Tablet 200 milliGRAM(s) Oral two times a day  aspirin 325 milliGRAM(s) Oral daily  atorvastatin 80 milliGRAM(s) Oral at bedtime  clopidogrel Tablet 75 milliGRAM(s) Oral daily  enoxaparin Injectable 40 milliGRAM(s) SubCutaneous daily  glucagon  Injectable 1 milliGRAM(s) IntraMuscular once  insulin glargine Injectable (LANTUS) 16 Unit(s) SubCutaneous at bedtime  insulin lispro (ADMELOG) corrective regimen sliding scale   SubCutaneous Before meals and at bedtime  insulin lispro Injectable (ADMELOG) 4 Unit(s) SubCutaneous three times a day with meals  metoprolol tartrate 12.5 milliGRAM(s) Oral two times a day  pantoprazole    Tablet 40 milliGRAM(s) Oral daily  sodium chloride 0.9% lock flush 3 milliLiter(s) IV Push every 8 hours    MEDICATIONS  (PRN):  acetaminophen   Tablet .. 650 milliGRAM(s) Oral every 6 hours PRN Mild Pain (1 - 3)  oxyCODONE    IR 5 milliGRAM(s) Oral every 4 hours PRN Moderate Pain (4 - 6)  oxyCODONE    IR 10 milliGRAM(s) Oral every 4 hours PRN Severe Pain (7 - 10)    Allergies: No Known Allergies    Vitals   T(C): 36.7 (2021 05:46), Max: 37.5 (2021 16:11)  T(F): 98.1 (2021 05:46), Max: 99.5 (2021 16:11)  HR: 73 (2021 05:46) (40 - 74)  BP: 132/50 (2021 05:46) (109/62 - 181/73)  RR: 17 (2021 05:46) (17 - 18)  SpO2: 95% (2021 05:46) (93% - 98%)    I&O's Detail    2021 07:01  -  2021 07:00  --------------------------------------------------------  IN:    Amiodarone: 33.3 mL    Amiodarone: 83.5 mL    Oral Fluid: 800 mL  Total IN: 916.8 mL    OUT:    Voided (mL): 650 mL  Total OUT: 650 mL    Total NET: 266.8 mL      2021 07:01  -  2021 06:06  --------------------------------------------------------  IN:    Oral Fluid: 960 mL  Total IN: 960 mL    OUT:    Voided (mL): 900 mL  Total OUT: 900 mL    Total NET: 60 mL    Physical Exam  Neuro: A+O x 2, non-focal, speech clear and intact  HEENT:  NCAT, PERRL, EOMI. No conjuctival edema or icterus, no thrush.    Neck:  Supple, trachea midline  Pulm: +Diminished BSs at bases b/l, no accessory muscle use noted  Chest: +PW (settings: VVI, rate: 40, mA: 10, sensitivity: 0.8)  CV: RRR, +S1S2  Abd: soft, NT, ND, + BS  Ext: VALENCIA x 4, no edema, no cyanosis or clubbing, distal motor/neuro/circ intact  Skin: warm, dry, well perfused  Incisions: midsternal mepilex C/D/I, sternum stable, RLE C/D/I    LABS                        8.5    11.72 )-----------( 231      ( 2021 01:25 )             26.6     04-    141  |  104  |  37.0<H>  ----------------------------<  117<H>  3.5   |  27.0  |  0.83    Ca    8.6      2021 01:25  Phos  2.7     -  Mg     2.3     -    POCT Blood Glucose.: 170 mg/dL (21 @ 21:24)  POCT Blood Glucose.: 179 mg/dL (21 @ 18:17)  POCT Blood Glucose.: 168 mg/dL (21 @ 17:09)  POCT Blood Glucose.: 270 mg/dL (21 @ 11:53)  POCT Blood Glucose.: 157 mg/dL (21 @ 07:56)      Last CXR:  < from: Xray Chest 1 View- PORTABLE-Routine (Xray Chest 1 View- PORTABLE-Routine in AM.) (21 @ 07:13) >  Two views are compared to 2021 and demonstrate mild cardiomegaly and normal pulmonary vasculature.  LEFT upper and left lower opacities consistent with infiltrate in the correct clinical context, unchanged.  No pneumothorax or acute osseous finding.  IMPRESSION:  Airspace disease in the left upper and left lower lobes, grossly unchanged  < end of copied text >  
  POD # 3 s/p CABG X 3 (LIMA to LAD, SVG to RPDA, SVG to DIAG) with LAD endarterectomy and right SVG harvest    PAST MEDICAL & SURGICAL HISTORY:  CAD (coronary artery disease)  Type 2 diabetes mellitus  Hypertension  H/O myocardial ischemia  Hyperlipemia  At risk for sleep apnea, Bang 5  Congestive heart failure  Dementia  H/O:     FAMILY HISTORY:  FH: heart disease (Sibling)  FH: diabetes mellitus (Sibling, Child)    Brief Hospital Course: 76 year old female with a medical history of type 2 DM (HA1C 9.4 on insulin at home), CAD, CHF, dementia, HTN, HLD, now s/p CABG X 3 (LIMA to LAD, SVG to RPDA, SVG to DIAG) with LAD endarterectomy and right SVG harvest on 21 with Dr. Valenzuela. Postoperative course complicated by FIDELINA (requiring amio gtt).     Significant recent/past 24 hr events: Patient's HR went to tata in the 40s requiring backup pacing and converted to FIDELINA overnight requiring 2 amio boluses and an amio gtt.     Subjective: Patient lying in bed in no acute distress. [place]  used throughout encounter. Patient remained asymptomatic throughout FIDELINA episodes. Denies fevers, chills, lightheadedness, dizziness, HA, CP, palpitations, SOB, cough, abdominal pain, N/V, diarrhea, numbness/tingling in extremities, or any other acute complaints.    MEDICATIONS  (STANDING):  aMIOdarone Infusion 1 mG/Min (33.3 mL/Hr) IV Continuous  aspirin 325 milliGRAM(s) Oral daily  atorvastatin 80 milliGRAM(s) Oral at bedtime  clopidogrel Tablet 75 milliGRAM(s) Oral daily  enoxaparin Injectable 40 milliGRAM(s) SubCutaneous daily  glucagon  Injectable 1 milliGRAM(s) IntraMuscular once  insulin glargine Injectable (LANTUS) 12 Unit(s) SubCutaneous at bedtime  insulin lispro (ADMELOG) corrective regimen sliding scale   SubCutaneous Before meals and at bedtime  insulin lispro Injectable (ADMELOG) 4 Unit(s) SubCutaneous three times a day with meals  metoprolol tartrate 25 milliGRAM(s) Oral two times a day  pantoprazole    Tablet 40 milliGRAM(s) Oral daily  sodium chloride 0.9% lock flush 3 milliLiter(s) IV Push every 8 hours    MEDICATIONS  (PRN):  acetaminophen   Tablet .. 650 milliGRAM(s) Oral every 6 hours PRN Mild Pain (1 - 3)  oxyCODONE    IR 5 milliGRAM(s) Oral every 4 hours PRN Moderate Pain (4 - 6)  oxyCODONE    IR 10 milliGRAM(s) Oral every 4 hours PRN Severe Pain (7 - 10)    Allergies: No Known Allergies    Vitals   T(C): 36.4 (2021 20:00), Max: 37 (2021 08:00)  T(F): 97.6 (2021 20:00), Max: 98.6 (2021 08:00)  HR: 139 (2021 02:50) (61 - 151)  BP: 128/72 (2021 02:50) (98/54 - 152/65)  BP(mean): 91 (2021 12:00) (71 - 99)  RR: 18 (2021 20:00) (12 - 28)  SpO2: 92% (2021 20:00) (91% - 100%)    I&O's Detail    2021 07:01  -  2021 07:00  --------------------------------------------------------  IN:    Albumin 5%  - 250 mL: 250 mL    Insulin: 12.5 mL    IV PiggyBack: 100 mL    IV PiggyBack: 250 mL    Oral Fluid: 300 mL    sodium chloride 0.9%: 55 mL    sodium chloride 0.9%: 110 mL  Total IN: 1077.5 mL    OUT:    Chest Tube (mL): 280 mL    Chest Tube (mL): 75 mL    Indwelling Catheter - Urethral (mL): 2040 mL    NiCARdipine: 0 mL    Norepinephrine: 0 mL  Total OUT: 2395 mL    Total NET: -1317.5 mL      2021 07:01  -  2021 03:22  --------------------------------------------------------  IN:    Amiodarone: 66.6 mL    IV PiggyBack: 250 mL    IV PiggyBack: 50 mL    Oral Fluid: 420 mL  Total IN: 786.6 mL    OUT:    Chest Tube (mL): 10 mL    Chest Tube (mL): 20 mL    Indwelling Catheter - Urethral (mL): 70 mL    Voided (mL): 640 mL  Total OUT: 740 mL    Total NET: 46.6 mL    Physical Exam  Neuro: A+O x 2, non-focal, speech clear and intact, Wol[place]  used throughout encounter  HEENT:  NCAT, PERRL, EOMI. No conjuctival edema or icterus, no thrush.    Neck:  Supple, trachea midline  Pulm: +Diminished BSs at bases b/l, no accessory muscle use noted  Chest:        Left pleural CT all with dressings intact and no air leak, no subQ emphysema       +PW (settings: VVI, rate: 45, mA: 10, sensitivity: 0.8)  CV: irregularly irregular, +S1S2  Abd: soft, NT, ND, + BS  Ext: VALENCIA x 4, no edema, no cyanosis or clubbing, distal motor/neuro/circ intact  Skin: warm, dry, well perfused  Incisions: midsternal mepilex C/D/I, sternum stable, RLE C/D/I    LABS                        9.7    14.36 )-----------( 160      ( 2021 02:38 )             30.0         142  |  102  |  37.0<H>  ----------------------------<  156<H>  4.0   |  26.0  |  0.88    Ca    8.7      2021 02:38  Mg     2.3         POCT Blood Glucose.: 92 mg/dL (21 @ 21:13)  POCT Blood Glucose.: 205 mg/dL (21 @ 16:54)  POCT Blood Glucose.: 234 mg/dL (21 @ 12:16)  POCT Blood Glucose.: 181 mg/dL (21 @ 06:14)      Last CXR:  < from: Xray Chest 1 View- PORTABLE-Urgent (Xray Chest 1 View- PORTABLE-Urgent .) (21 @ 16:40) >  Two expiratory/kyphotic views demonstrate chest tube with no pneumothorax. Left chest tube remains in place.  Moderate enlargement of the cardiac silhouette consistent with cardiomegaly in the absence of evidence for pericardial effusion, unchanged.  Mild pulmonary venous congestion/possible perihilar infiltrates, improved. No gross consolidation, effusion or acute osseous abnormality.  Post sternotomy, median line of skin closures noted.  IMPRESSION:  Chest tube removed, left chest tube remains in place, no pneumothorax.  Mild pulmonary venous congestion/possible perihilar interstitial infiltrates, improved  < end of copied text >  
Significant recent/past 24 hr events:  No acute overnight events. Pt remained HD stable and without acute complaints.  Pt  NAD and without acute complaints. Denies N/V/D HA, dizziness, blurry vision, numbness/tingling, SOB, cough, chest pain, palpitations, abd pain or urinary sx's.     Subjective:  Review of Systems   ROS negative x 10 systems except as noted above    Patient is a 76y old  Female who presents with a chief complaint of CAD, s/p CABG (2021 17:24)    HPI:  75 y/o female presents to PST today. Patient reports a history of heart disease for about 20 years. Reports that she was told about a month ago that she had a myocardial infarction. As per son, Patient has about 3-4 nblocked vessels in the heart. Patient c/o chest pain  radiating through right upper back. Denies any fever, SOB, Chills. Scheduled for CABG x4 with Dr. Valenzuela 2021.  (2021 15:06)    PAST MEDICAL & SURGICAL HISTORY:  CAD (coronary artery disease)    Type 2 diabetes mellitus    Hypertension    H/O myocardial ischemia    Hyperlipemia    At risk for sleep apnea  Bang 5    Congestive heart failure    Dementia    H/O:       FAMILY HISTORY:  FH: heart disease (Sibling)    FH: diabetes mellitus (Sibling, Child)      Vitals   ICU Vital Signs Last 24 Hrs  T(C): 36.7 (2021 04:00), Max: 37.2 (2021 16:05)  T(F): 98 (2021 04:00), Max: 98.9 (2021 16:05)  HR: 71 (2021 08:00) (61 - 83)  BP: 127/58 (2021 08:00) (115/56 - 175/77)  BP(mean): 84 (2021 08:00) (80 - 113)  ABP: 109/47 (2021 18:00) (76/41 - 336/317)  ABP(mean): 69 (2021 18:00) (52 - 328)  RR: 24 (2021 08:00) (15 - 53)  SpO2: 100% (2021 08:00) (88% - 100%)      VENT SETTINGS     ABG - ( 2021 19:39 )  pH, Arterial: 7.30  pH, Blood: x     /  pCO2: 48    /  pO2: 150   / HCO3: 22    / Base Excess: -3.1  /  SaO2: 100         I&O's Detail    2021 07:01  -  2021 07:00  --------------------------------------------------------  IN:    Albumin 5%  - 250 mL: 250 mL    Insulin: 12.5 mL    IV PiggyBack: 100 mL    IV PiggyBack: 250 mL    Oral Fluid: 300 mL    sodium chloride 0.9%: 55 mL    sodium chloride 0.9%: 110 mL  Total IN: 1077.5 mL    OUT:    Chest Tube (mL): 280 mL    Chest Tube (mL): 75 mL    Indwelling Catheter - Urethral (mL): 2040 mL    NiCARdipine: 0 mL    Norepinephrine: 0 mL  Total OUT: 2395 mL    Total NET: -1317.5 mL      2021 07:01  -  2021 08:56  --------------------------------------------------------  IN:    Oral Fluid: 180 mL  Total IN: 180 mL    OUT:    Chest Tube (mL): 20 mL    Chest Tube (mL): 10 mL    Indwelling Catheter - Urethral (mL): 50 mL  Total OUT: 80 mL    Total NET: 100 mL    LABS                        10.0   17.56 )-----------( 150      ( 2021 03:03 )             32.1     04-21    142  |  101  |  24.0<H>  ----------------------------<  173<H>  4.4   |  26.0  |  0.88    Ca    9.0      2021 03:03  Mg     2.2     04-    TPro  5.4<L>  /  Alb  3.9  /  TBili  0.5  /  DBili  x   /  AST  22  /  ALT  8   /  AlkPhos  47  04-    LIVER FUNCTIONS - ( 2021 12:51 )  Alb: 3.9 g/dL / Pro: 5.4 g/dL / ALK PHOS: 47 U/L / ALT: 8 U/L / AST: 22 U/L / GGT: x           PT/INR - ( 2021 12:51 )   PT: 14.6 sec;   INR: 1.27 ratio           CARDIAC MARKERS ( 2021 03:03 )  CKx     / CKMBx     / Troponin T0.30 ng/mL /      POCT Blood Glucose.: 181 mg/dL (21 @ 06:14)  POCT Blood Glucose.: 126 mg/dL (21 @ 20:54)  POCT Blood Glucose.: 105 mg/dL (21 @ 18:39)  POCT Blood Glucose.: 100 mg/dL (21 @ 17:11)  POCT Blood Glucose.: 137 mg/dL (21 @ 14:08)  POCT Blood Glucose.: 125 mg/dL (21 @ 11:27)  POCT Blood Glucose.: 152 mg/dL (21 @ 09:21)      MEDICATIONS  (STANDING):  aspirin 325 milliGRAM(s) Oral daily  atorvastatin 80 milliGRAM(s) Oral at bedtime  cefuroxime  IVPB 1500 milliGRAM(s) IV Intermittent every 8 hours  chlorhexidine 0.12% Liquid 5 milliLiter(s) Oral Mucosa two times a day  clopidogrel Tablet 75 milliGRAM(s) Oral daily  enoxaparin Injectable 40 milliGRAM(s) SubCutaneous daily  glucagon  Injectable 1 milliGRAM(s) IntraMuscular once  insulin glargine Injectable (LANTUS) 12 Unit(s) SubCutaneous at bedtime  insulin lispro (ADMELOG) corrective regimen sliding scale   SubCutaneous Before meals and at bedtime  insulin lispro Injectable (ADMELOG) 2 Unit(s) SubCutaneous three times a day before meals  metoprolol tartrate 25 milliGRAM(s) Oral two times a day  pantoprazole    Tablet 40 milliGRAM(s) Oral daily  vancomycin  IVPB 750 milliGRAM(s) IV Intermittent every 12 hours    MEDICATIONS  (PRN):  acetaminophen   Tablet .. 650 milliGRAM(s) Oral every 6 hours PRN Mild Pain (1 - 3)  oxyCODONE    IR 5 milliGRAM(s) Oral every 4 hours PRN Moderate Pain (4 - 6)  oxyCODONE    IR 10 milliGRAM(s) Oral every 4 hours PRN Severe Pain (7 - 10)      Allergies:  No Known Allergies      Physical Exam:   Constitutional: NAD  Neck: supple,  No JVD  Respiratory: Breath Sounds equal & clear bilaterally to auscultation, no accessory muscle use noted  Cardiovascular: Regular rate, regular rhythm, normal S1, S2; no murmurs or rub  Gastrointestinal: Soft, non-tender, non distended, normal bowel sounds  Extremities: VALENCIA x 4, Mild LE peripheral edema, no cyanosis, no clubbing   Vascular: Equal and normal pulses: 2+ peripheral pulses throughout  Neurological: A+O x 3; speech clear and intact; no gross sensory/motor deficits  Psychiatric: calm, normal mood, normal affect  Skin: warm, dry, well perfused, no rashes  Tubes/Lines: Mediastinal & Lt pleural CT-No obvious air leak noted.   Incision: Sternal incision (No audible click)  C/D/I without obvious bleeding/discharge. EVH site D/I without obvious bleeding/discharge.       Code Status: Full Code      Critical care time spent: 35 minutes (reviewing chart including medication, labs and imaging results, discussions with interdisciplinary team, discussing goals of care/advanced directives, counseling patient and/or family, non-inclusive of procedures)    Case including assessment/plan of care discussed with  attending.  
SUBJECTIVE   Arabic speaking patient    used     INTERIM HISTORY SIGNIFICANT FOR   patient with intermittent confusion throughout the day - was able to be reoriented   acute delirium / combative episode with concern for patient safety    used at that time significant for patient believing we where in her home, she wanted to go to the kitchen to make food and thought she was hypoglycemic   finger stick was 126 at the time   ultimately patient calmed down by giving her OREOs - & haldol   FOR DAY PLAN PURPOSES PATIENT WAS NPO PENDING POSSIBLE PPM - PATIENT WITNESSED EATING 4xOREOS at 1245 and drank THREE SIPS OF WATER     Patient is a 76y old  Female who presents with a chief complaint of  (2021 11:23)    HPI:  75 y/o female presents to PST today. Patient reports a history of heart disease for about 20 years. Reports that she was told about a month ago that she had a myocardial infarction. As per son, Patient has about 3-4 nblocked vessels in the heart. Patient c/o chest pain  radiating through right upper back. Denies any fever, SOB, Chills. Scheduled for CABG x4 with Dr. Valenzuela 2021.  (2021 15:06)    OBJECTIVE  PAST MEDICAL & SURGICAL HISTORY:  CAD (coronary artery disease)  Type 2 diabetes mellitus  Hypertension  H/O myocardial ischemia  Hyperlipemia  At risk for sleep apnea  Bang 5  Congestive heart failure  Dementia  H/O:       No Known Allergies  OHS (Unknown)    Home Medications:  albuterol:  (2021 12:29)  aspirin 325 mg oral tablet: orally once a day (2021 12:29)  HumaLOG:  (2021 12:29)  isosorbide mononitrate: 60 milligram(s) orally (2021 12:29)  Lantus:  (2021 12:29)  Lasix 40 mg oral tablet: 1 tab(s) orally once a day (2021 12:29)  Lipitor 80 mg oral tablet: 1 tab(s) orally once a day (2021 12:29)  losartan: 100 milligram(s) orally once a day (2021 12:29)  Metoprolol Tartrate 50 mg oral tablet: 1 tab(s) orally 2 times a day (2021 12:29)    VITALS  ICU Vital Signs Last 24 Hrs  T(C): 36.9 (2021 21:17), Max: 37.7 (2021 10:24)  T(F): 98.5 (2021 21:17), Max: 99.8 (2021 10:24)  HR: 80 (2021 21:17) (63 - 88)  BP: 154/77 (2021 21:17) (123/62 - 169/99)  RR: 16 (2021 21:17) (16 - 18)  SpO2: 93% (2021 21:17) (93% - 100%)      LABS                        10.4   12.41 )-----------( 335      ( 2021 06:42 )             33.0       137  |  97<L>  |  26.0<H>  ----------------------------<  158<H>  4.2   |  28.0  |  0.77    Ca    9.1      2021 06:42  Phos  2.2       Mg     2.0         TPro  6.8  /  Alb  3.8  /  TBili  0.8  /  DBili  x   /  AST  14  /  ALT  13  /  AlkPhos  82    CAPILLARY BLOOD GLUCOSE      POCT Blood Glucose.: 126 mg/dL (2021 00:31)        IN/OUT    IMAGING  personally reviewed imaging   Xray Chest 1 View- PORTABLE-Routine:    EXAM:  XR CHEST PORTABLE ROUTINE 1V                          PROCEDURE DATE:  2021          INTERPRETATION:  Portable chest    CLINICAL INFORMATION:  Postoperative  Cardiac surgery.    TECHNIQUE:  Portable  AP view of the chest was obtained.    FINDINGS:   2021 chest available for review.  The lungs show residual LEFT upper lobe opacity with curvilinear inferior margin. LEFT upper lobe nodule cannot be excluded. Follow-up chest CT recommended.  No pneumothorax.  The heart and mediastinum are within normal limits following cardiac  surgery.    Visualized osseous structures are intact.        IMPRESSION:  Status post cardiac surgery.  LEFT UPPER LOBE OF RESIDUAL NODULE/MASS. FOLLOW-UP CHEST CT RECOMMENDED                HOLLAND MARIE MD; Attending Radiologist  This document has been electronically signed. 2021  3:41PM (21 @ 10:59)    CURRENT MEDICATIONS  MEDICATIONS  (STANDING):  aMIOdarone    Tablet 200 milliGRAM(s) Oral two times a day  amLODIPine   Tablet 5 milliGRAM(s) Oral daily  aspirin 325 milliGRAM(s) Oral daily  atorvastatin 80 milliGRAM(s) Oral at bedtime  clopidogrel Tablet 75 milliGRAM(s) Oral daily  furosemide    Tablet 40 milliGRAM(s) Oral daily  haloperidol    Injectable 2.5 milliGRAM(s) IntraMuscular once  insulin glargine Injectable (LANTUS) 18 Unit(s) SubCutaneous at bedtime  insulin lispro (ADMELOG) corrective regimen sliding scale   SubCutaneous Before meals and at bedtime  insulin lispro Injectable (ADMELOG) 4 Unit(s) SubCutaneous three times a day with meals  metoprolol tartrate 12.5 milliGRAM(s) Oral two times a day  pantoprazole    Tablet 40 milliGRAM(s) Oral daily  potassium chloride    Tablet ER 20 milliEquivalent(s) Oral daily  sertraline 50 milliGRAM(s) Oral daily  sodium chloride 0.9% lock flush 3 milliLiter(s) IV Push every 8 hours    MEDICATIONS  (PRN):  acetaminophen   Tablet .. 650 milliGRAM(s) Oral every 6 hours PRN Mild Pain (1 - 3)

## 2021-04-27 ENCOUNTER — TRANSCRIPTION ENCOUNTER (OUTPATIENT)
Age: 76
End: 2021-04-27

## 2021-04-27 RX ORDER — LOSARTAN POTASSIUM 100 MG/1
TABLET, FILM COATED ORAL
Refills: 0 | Status: DISCONTINUED | COMMUNITY
End: 2021-04-27

## 2021-04-27 RX ORDER — ISOSORBIDE MONONITRATE 20 MG/1
TABLET ORAL
Refills: 0 | Status: DISCONTINUED | COMMUNITY
End: 2021-04-27

## 2021-04-27 RX ORDER — METOPROLOL TARTRATE 50 MG/1
50 TABLET, FILM COATED ORAL
Refills: 0 | Status: DISCONTINUED | COMMUNITY
End: 2021-04-27

## 2021-04-29 ENCOUNTER — APPOINTMENT (OUTPATIENT)
Dept: CARE COORDINATION | Facility: HOME HEALTH | Age: 76
End: 2021-04-29
Payer: MEDICARE

## 2021-04-29 VITALS
RESPIRATION RATE: 15 BRPM | DIASTOLIC BLOOD PRESSURE: 72 MMHG | HEART RATE: 90 BPM | WEIGHT: 113 LBS | BODY MASS INDEX: 23.72 KG/M2 | HEIGHT: 58 IN | OXYGEN SATURATION: 98 % | SYSTOLIC BLOOD PRESSURE: 120 MMHG

## 2021-04-29 PROCEDURE — 99024 POSTOP FOLLOW-UP VISIT: CPT

## 2021-04-29 NOTE — REASON FOR VISIT
[Follow-Up: _____] : a [unfilled] follow-up visit [Family Member] : family member [FreeTextEntry1] : FOLLOW YOUR HEART- Transitional Care Management Program- [facility]\par \par

## 2021-04-29 NOTE — HISTORY OF PRESENT ILLNESS
[FreeTextEntry1] : 76 year old female with a medical history of type 2 DM (HA1C 9.4 on insulin at \par home), CAD, CHF, dementia, HTN, HLD, now s/p CABG X 3 (LIMA to LAD, SVG to \par RPDA, SVG to DIAG) with LAD endarterectomy and right SVG harvest on 4/19/21 \par with Dr. Valenzuela. Postoperative course complicated by FIDELINA and Bradycardia.  \par 4/26-EP cleared for D/C no indication for PPM. Language barrier precludes \par benefit for [facility]. Pt discharged home with supportive spouse and home care services. Initial [facility] visit completed in home. \par CC "I"m ok"

## 2021-04-29 NOTE — PHYSICAL EXAM
[Sclera] : the sclera and conjunctiva were normal [Neck Appearance] : the appearance of the neck was normal [Respiration, Rhythm And Depth] : normal respiratory rhythm and effort [Apical Impulse] : the apical impulse was normal [Heart Rate And Rhythm] : heart rate was normal and rhythm regular [Heart Sounds] : normal S1 and S2 [Murmurs] : no murmurs [Chest Visual Inspection Thoracic Asymmetry] : no chest asymmetry [1+] : left 1+ [FreeTextEntry2] : B/L LE without edema, B/L calves soft, NT [Bowel Sounds] : normal bowel sounds [Abdomen Soft] : soft [Abdomen Tenderness] : non-tender [Abnormal Walk] : normal gait [Musculoskeletal - Swelling] : no joint swelling seen [Skin Color & Pigmentation] : normal skin color and pigmentation [Skin Turgor] : normal skin turgor [] : no rash [Oriented To Time, Place, And Person] : oriented to person, place, and time [Affect] : the affect was normal [FreeTextEntry1] : Neuro/Psych status as per baseline as per son

## 2021-04-29 NOTE — ASSESSMENT
[FreeTextEntry1] : Pt recovering well at home s/p OHS. Reviewed all medications and dosages with pt and pt son understanding. Son questioned need for several medications, encouraged importance of med compliance and reason for each medication post surgery. Advised medications will be adjusted as appropriate by Dr. Pineda when pt has a follow up appt. Pt Potassium supplement changed to liquid form as requested as pt cannot swallow even when halved. \par Pt instructed to use Senna at HS and Miralax once daily for constipation relief. Both RX sent to pt pharmacy as pt has Medicaid and will have coverage for OTC meds as per son. Pt has all other medications in home and is taking as prescribed.\par  Pt incision without concern at this time, however, pt has not showered or cleansed site since being home. She feels the shower will make her "weak" and she was not ready yet. Stressed the importance of cleansing wound daily even if she did not prefer to get in to the shower. Demonstrated how to cleanse incisions. Pt with only one BG reading since being home of 300 as son stated she ran out of strips for glucometer and he obtained more today. Pt son to assist pt in checking prior to each meal going forward and provide sliding scale dosage. Pt on Lantus 10 units at HS, pt son advised this may need to be adjusted if BG fasting remains above 150 consistently. Son with understanding of risk of serious infection with poor incisional hygiene and poor BG control. Pain controlled with current medication regimen. No further symptoms, issues or concerns to report at this time.\par

## 2021-05-04 ENCOUNTER — NON-APPOINTMENT (OUTPATIENT)
Age: 76
End: 2021-05-04

## 2021-05-11 ENCOUNTER — APPOINTMENT (OUTPATIENT)
Dept: CARDIOTHORACIC SURGERY | Facility: CLINIC | Age: 76
End: 2021-05-11
Payer: MEDICARE

## 2021-05-11 VITALS
BODY MASS INDEX: 23.72 KG/M2 | DIASTOLIC BLOOD PRESSURE: 74 MMHG | WEIGHT: 113 LBS | OXYGEN SATURATION: 97 % | SYSTOLIC BLOOD PRESSURE: 125 MMHG | RESPIRATION RATE: 18 BRPM | HEIGHT: 58 IN

## 2021-05-11 PROCEDURE — 99024 POSTOP FOLLOW-UP VISIT: CPT

## 2021-05-11 RX ORDER — ALBUTEROL 90 MCG
AEROSOL (GRAM) INHALATION
Refills: 0 | Status: COMPLETED | COMMUNITY
End: 2021-05-11

## 2021-05-18 ENCOUNTER — NON-APPOINTMENT (OUTPATIENT)
Age: 76
End: 2021-05-18

## 2021-05-24 ENCOUNTER — APPOINTMENT (OUTPATIENT)
Dept: CARDIOLOGY | Facility: CLINIC | Age: 76
End: 2021-05-24
Payer: MEDICARE

## 2021-05-24 ENCOUNTER — NON-APPOINTMENT (OUTPATIENT)
Age: 76
End: 2021-05-24

## 2021-05-24 VITALS
SYSTOLIC BLOOD PRESSURE: 136 MMHG | TEMPERATURE: 99.2 F | HEART RATE: 87 BPM | WEIGHT: 113.4 LBS | DIASTOLIC BLOOD PRESSURE: 70 MMHG | BODY MASS INDEX: 23.8 KG/M2 | HEIGHT: 58 IN | OXYGEN SATURATION: 97 %

## 2021-05-24 DIAGNOSIS — Z95.1 PRESENCE OF AORTOCORONARY BYPASS GRAFT: ICD-10-CM

## 2021-05-24 PROCEDURE — 93000 ELECTROCARDIOGRAM COMPLETE: CPT

## 2021-05-24 PROCEDURE — 99214 OFFICE O/P EST MOD 30 MIN: CPT

## 2021-05-24 RX ORDER — FUROSEMIDE 40 MG/1
40 TABLET ORAL
Refills: 0 | Status: DISCONTINUED | COMMUNITY
End: 2021-05-24

## 2021-05-24 RX ORDER — ACETAMINOPHEN 325 MG/1
TABLET, FILM COATED ORAL EVERY 6 HOURS
Refills: 0 | Status: ACTIVE | COMMUNITY

## 2021-05-24 RX ORDER — POTASSIUM CHLORIDE 1500 MG/1
20 TABLET, FILM COATED, EXTENDED RELEASE ORAL DAILY
Qty: 10 | Refills: 0 | Status: DISCONTINUED | COMMUNITY
End: 2021-05-24

## 2021-05-24 RX ORDER — POTASSIUM CHLORIDE 1.5 G/1.58G
20 POWDER, FOR SOLUTION ORAL
Refills: 0 | Status: DISCONTINUED | COMMUNITY
End: 2021-05-24

## 2021-05-24 NOTE — CARDIOLOGY SUMMARY
[de-identified] : 5/24/2021\par Sinus  Rhythm \par -Right bundle branch block. \par  -Left atrial enlargement. \par ABNORMAL \par

## 2021-05-24 NOTE — HISTORY OF PRESENT ILLNESS
[FreeTextEntry1] : Son in attendance\par \par HOSPITAL DISCHARGE FU\par s/p  CABG x 3 (LIMA-LAD, SVG-Diag, SVG-PDA) Endarterectomy of the LAD and she is here for a post-op visit. \par Surgery Date: 4/19/21 \par Feels well. Denied dyspnea, cp, palpation, syncope ,near syncope.\par She c/o mild midsternum soreness that is managed with Tylenol OTC. This was evaluated by CT Surgery already. \par Pt states she has accidents with urinary gaby while on frusemide and unwilling to continue. \par \par Current Cardio Meds\par Lasix 40 mg daily'\par Potassium Chloride ER 20 MEQ daily\par Amiodarone HCl - 200 MG daily (To be completed in this week) (Post Op FIDELINA) \par amLODIPine  5 MG daily\par Lipitor 80 MG daily\par Plavix 75 MG daily (Continue Plavix for 3 months post-op)\par Toprol XL 25 MG daily \par \par FUNCTIONAL CAPACITY\par Walks w/o assistance\par Walks in the house\par \par CHRONIC, STABLE CONDITIONS\par CAD: s/p CABG 4/2021\par HTN\par CHF\par Post Op FIDELINA: During hospital stay she had 4/23 bradycardia down and require pacing medications adjusted as per EP. Still overnight intermittent bradycardia requiring back p pacing, 4/26: Home EP cleared for D/C no indication for PPM. [facility]OT.\par Echo 3/23/2021  LVEF 35-40. \par LHC 3/23/2021: LVEF 40%.Severe CAD.. \par

## 2021-05-26 ENCOUNTER — TRANSCRIPTION ENCOUNTER (OUTPATIENT)
Age: 76
End: 2021-05-26

## 2021-05-26 ENCOUNTER — APPOINTMENT (OUTPATIENT)
Dept: CARDIOLOGY | Facility: CLINIC | Age: 76
End: 2021-05-26
Payer: MEDICARE

## 2021-05-26 PROCEDURE — 93306 TTE W/DOPPLER COMPLETE: CPT

## 2021-05-27 NOTE — H&P PST ADULT - CORNEAL ABRASION RISK
Physical Therapy     Referred by: Mauricio Shafer PA-C; Medical Diagnosis (from order):    Diagnosis Information      Diagnosis    724.4 (ICD-9-CM) - M54.16 (ICD-10-CM) - Lumbar radiculopathy    722.52 (ICD-9-CM) - M51.36 (ICD-10-CM) - Lumbar degenerative disc disease    722.52 (ICD-9-CM) - M51.37 (ICD-10-CM) - Disc degeneration, lumbosacral    756.12 (ICD-9-CM) - M43.16 (ICD-10-CM) - Spondylolisthesis at L3-L4 level    781.99 (ICD-9-CM) - R26.89 (ICD-10-CM) - Balance disorder    336.8 (ICD-9-CM) - G95.89 (ICD-10-CM) - Intradural mass (CMS/HCC)                Daily Treatment Note -  Physical Therapy    Visit:  13   Patient alert and oriented X3.    SUBJECTIVE                                                                                                             Patient reports \"whatever we did the other day has relieved my pain\"  Patient reports patient has not had pain since last physical therapy session  Patient reports he had a neighbor carry mulch in a wheel [place] and patient did the spreading of mulch up to 3 hours yesterday and reports was without increased pain   Pain / Symptoms:  Pain rating (out of 10): Current: 0     OBJECTIVE                                                                                                                       Palpation:   Comments / Details: Moderate tightness along the lumbar paraspinals musculature and scar tissue adhesions along the lumbar incisional scar      TREATMENT                                                                                                                  Manual Therapy:  Patient in right side lying position  Patient educated on rationale and benefits of Soft tissue mobilization. Treatment included effleurage (gliding strokes) and pétrissage (kneading) strokes, friction (circular pressure) as indicated to decrease pain and tightness / soft tissue restriction in lumbar paraspinals musculature and scar mobilizations to soften the incisional  scar.  Patient response: decreased pain and tightness      Skilled input: verbal instruction/cues and as detailed above    Writer verbally educated and received verbal consent for hand placement, positioning of patient, and techniques to be performed today from patient for clothing adjustments for techniques, therapist position for techniques, modality application and hand placement and palpation for techniques as described above and how they are pertinent to the patient's plan of care.    Home Exercise Program: Added   Access Code: [email or web address]  URL: [email or web address]  Date: 05/20/2021  Prepared by: Dior Lopez    Exercises  Hook Lying Single Knee to Chest Stretch with Towel - 1-2 x daily - 1 sets - 5 reps - 10 hold  Seated Flexion Stretch - 1-2 x daily - 1 sets - 5 reps - 10 hold    Mechanical Traction:  Mechanical Lumbar Traction (28658)    intermittent Pounds: 70-30 Seconds (hold/relax): 60/20  Duration: 15 minutes  Results: decreased pain  Reaction: no adverse reaction to treatment      ASSESSMENT                                                                                                             Symptoms continue to steadily decrease -- good response with the addition of soft tissue mobilization   Pain/symptoms after session (out of 10): 0    Patient Education:   Results of above outlined education: Verbalizes understanding and Demonstrates understanding      PLAN                                                                                                                           Suggestions for next session as indicated: Progress per plan of care  Soft tissue mobilization   Traction          Therapy procedure time and total treatment time can be found documented on the Time Entry flowsheet   Denies

## 2021-07-14 ENCOUNTER — EMERGENCY (EMERGENCY)
Facility: HOSPITAL | Age: 76
LOS: 1 days | Discharge: HOME | End: 2021-07-14
Attending: EMERGENCY MEDICINE
Payer: MEDICARE

## 2021-07-14 VITALS
SYSTOLIC BLOOD PRESSURE: 142 MMHG | RESPIRATION RATE: 16 BRPM | OXYGEN SATURATION: 98 % | HEART RATE: 95 BPM | DIASTOLIC BLOOD PRESSURE: 71 MMHG | TEMPERATURE: 98 F

## 2021-07-14 VITALS
DIASTOLIC BLOOD PRESSURE: 98 MMHG | HEIGHT: 57 IN | OXYGEN SATURATION: 100 % | HEART RATE: 106 BPM | TEMPERATURE: 99 F | RESPIRATION RATE: 22 BRPM | SYSTOLIC BLOOD PRESSURE: 166 MMHG

## 2021-07-14 DIAGNOSIS — Z98.891 HISTORY OF UTERINE SCAR FROM PREVIOUS SURGERY: Chronic | ICD-10-CM

## 2021-07-14 LAB
ALBUMIN SERPL ELPH-MCNC: 3.8 G/DL — SIGNIFICANT CHANGE UP (ref 3.3–5.2)
ALP SERPL-CCNC: 101 U/L — SIGNIFICANT CHANGE UP (ref 40–120)
ALT FLD-CCNC: 9 U/L — SIGNIFICANT CHANGE UP
ANION GAP SERPL CALC-SCNC: 16 MMOL/L — SIGNIFICANT CHANGE UP (ref 5–17)
APPEARANCE UR: CLEAR — SIGNIFICANT CHANGE UP
AST SERPL-CCNC: 12 U/L — SIGNIFICANT CHANGE UP
BASOPHILS # BLD AUTO: 0.06 K/UL — SIGNIFICANT CHANGE UP (ref 0–0.2)
BASOPHILS NFR BLD AUTO: 0.4 % — SIGNIFICANT CHANGE UP (ref 0–2)
BILIRUB SERPL-MCNC: 0.3 MG/DL — LOW (ref 0.4–2)
BILIRUB UR-MCNC: NEGATIVE — SIGNIFICANT CHANGE UP
BUN SERPL-MCNC: 28.1 MG/DL — HIGH (ref 8–20)
CALCIUM SERPL-MCNC: 8.9 MG/DL — SIGNIFICANT CHANGE UP (ref 8.6–10.2)
CHLORIDE SERPL-SCNC: 100 MMOL/L — SIGNIFICANT CHANGE UP (ref 98–107)
CO2 SERPL-SCNC: 21 MMOL/L — LOW (ref 22–29)
COLOR SPEC: YELLOW — SIGNIFICANT CHANGE UP
CREAT SERPL-MCNC: 0.93 MG/DL — SIGNIFICANT CHANGE UP (ref 0.5–1.3)
DIFF PNL FLD: ABNORMAL
EOSINOPHIL # BLD AUTO: 0.08 K/UL — SIGNIFICANT CHANGE UP (ref 0–0.5)
EOSINOPHIL NFR BLD AUTO: 0.6 % — SIGNIFICANT CHANGE UP (ref 0–6)
EPI CELLS # UR: SIGNIFICANT CHANGE UP
GLUCOSE SERPL-MCNC: 333 MG/DL — HIGH (ref 70–99)
GLUCOSE UR QL: 1000 MG/DL
HCT VFR BLD CALC: 40.1 % — SIGNIFICANT CHANGE UP (ref 34.5–45)
HGB BLD-MCNC: 12.6 G/DL — SIGNIFICANT CHANGE UP (ref 11.5–15.5)
IMM GRANULOCYTES NFR BLD AUTO: 0.4 % — SIGNIFICANT CHANGE UP (ref 0–1.5)
KETONES UR-MCNC: NEGATIVE — SIGNIFICANT CHANGE UP
LEUKOCYTE ESTERASE UR-ACNC: NEGATIVE — SIGNIFICANT CHANGE UP
LYMPHOCYTES # BLD AUTO: 1.14 K/UL — SIGNIFICANT CHANGE UP (ref 1–3.3)
LYMPHOCYTES # BLD AUTO: 8.4 % — LOW (ref 13–44)
MCHC RBC-ENTMCNC: 25.7 PG — LOW (ref 27–34)
MCHC RBC-ENTMCNC: 31.4 GM/DL — LOW (ref 32–36)
MCV RBC AUTO: 81.7 FL — SIGNIFICANT CHANGE UP (ref 80–100)
MONOCYTES # BLD AUTO: 0.89 K/UL — SIGNIFICANT CHANGE UP (ref 0–0.9)
MONOCYTES NFR BLD AUTO: 6.5 % — SIGNIFICANT CHANGE UP (ref 2–14)
NEUTROPHILS # BLD AUTO: 11.41 K/UL — HIGH (ref 1.8–7.4)
NEUTROPHILS NFR BLD AUTO: 83.7 % — HIGH (ref 43–77)
NITRITE UR-MCNC: NEGATIVE — SIGNIFICANT CHANGE UP
PH UR: 5 — SIGNIFICANT CHANGE UP (ref 5–8)
PLATELET # BLD AUTO: 330 K/UL — SIGNIFICANT CHANGE UP (ref 150–400)
POTASSIUM SERPL-MCNC: 4.1 MMOL/L — SIGNIFICANT CHANGE UP (ref 3.5–5.3)
POTASSIUM SERPL-SCNC: 4.1 MMOL/L — SIGNIFICANT CHANGE UP (ref 3.5–5.3)
PROT SERPL-MCNC: 7.9 G/DL — SIGNIFICANT CHANGE UP (ref 6.6–8.7)
PROT UR-MCNC: 500 MG/DL
RBC # BLD: 4.91 M/UL — SIGNIFICANT CHANGE UP (ref 3.8–5.2)
RBC # FLD: 14 % — SIGNIFICANT CHANGE UP (ref 10.3–14.5)
RBC CASTS # UR COMP ASSIST: ABNORMAL /HPF (ref 0–4)
SODIUM SERPL-SCNC: 137 MMOL/L — SIGNIFICANT CHANGE UP (ref 135–145)
SP GR SPEC: 1.02 — SIGNIFICANT CHANGE UP (ref 1.01–1.02)
TROPONIN T SERPL-MCNC: 0.02 NG/ML — SIGNIFICANT CHANGE UP (ref 0–0.06)
UROBILINOGEN FLD QL: NEGATIVE MG/DL — SIGNIFICANT CHANGE UP
WBC # BLD: 13.64 K/UL — HIGH (ref 3.8–10.5)
WBC # FLD AUTO: 13.64 K/UL — HIGH (ref 3.8–10.5)
WBC UR QL: NEGATIVE — SIGNIFICANT CHANGE UP

## 2021-07-14 PROCEDURE — 99283 EMERGENCY DEPT VISIT LOW MDM: CPT

## 2021-07-14 PROCEDURE — 84484 ASSAY OF TROPONIN QUANT: CPT

## 2021-07-14 PROCEDURE — 80053 COMPREHEN METABOLIC PANEL: CPT

## 2021-07-14 PROCEDURE — 93005 ELECTROCARDIOGRAM TRACING: CPT

## 2021-07-14 PROCEDURE — 99285 EMERGENCY DEPT VISIT HI MDM: CPT

## 2021-07-14 PROCEDURE — 85025 COMPLETE CBC W/AUTO DIFF WBC: CPT

## 2021-07-14 PROCEDURE — 93010 ELECTROCARDIOGRAM REPORT: CPT

## 2021-07-14 PROCEDURE — 81001 URINALYSIS AUTO W/SCOPE: CPT

## 2021-07-14 PROCEDURE — 36415 COLL VENOUS BLD VENIPUNCTURE: CPT

## 2021-07-14 NOTE — ED ADULT NURSE REASSESSMENT NOTE - NS ED NURSE REASSESS COMMENT FT1
dr. bartlett made aware of elevated BUN and elevated glucose.  states that it is not acute and she will educate patient and family on staying well hydrated.  vitals stable.  Nikolas RN

## 2021-07-14 NOTE — ED PROVIDER NOTE - ATTENDING CONTRIBUTION TO CARE
pt s/p cabg in april states came in b/c she felt throat pain and was afraid was having another heart attack    no chest pain , ekg wnl

## 2021-07-14 NOTE — ED PROVIDER NOTE - NS ED ROS FT
Review of Systems  •	CONSTITUTIONAL - no  fever, no diaphoresis, no weight change  •	SKIN - no rash  •	HEMATOLOGIC - no bleeding, no bruising  •	EYES - no eye pain, no blurred vision  •	ENT - no change in hearing, reports sore throat  •	RESPIRATORY - no shortness of breath, no cough  •	CARDIAC - no chest pain, no palpitations  •	GI - no abd pain, no nausea, no vomiting, no diarrhea, no constipation, no bleeding  •	GENITO-URINARY - no discharge, no dysuria; no hematuria,   •	ENDO - no polydipsia, no polyuria, no heat/no cold intolerance  •	MUSCULOSKELETAL - no joint pain, no swelling, no redness  •	NEUROLOGIC - no weakness, no headache, no anesthesia, (+) paresthesias in stocking-glove distribution  •	PSYCH - no anxiety, non suicidal, non homicidal, no hallucination, no depression

## 2021-07-14 NOTE — ED PROVIDER NOTE - CLINICAL SUMMARY MEDICAL DECISION MAKING FREE TEXT BOX
pt s/o cabg in april states came in b/c she felt throat pain and was afraid was having another heart attack    no chest pain , ekg wnl pt s/p cabg in april states came in b/c she felt throat pain and was afraid was having another heart attack    no chest pain , ekg wnl pt s/p cabg in april states came in b/c she felt throat pain and was afraid was having another heart attack   no chest pain , ekg wnl

## 2021-07-14 NOTE — ED ADULT TRIAGE NOTE - CHIEF COMPLAINT QUOTE
BIBA c/o SOB, nausea, chest discomfort. Per EMS pt found to be hypertensive & hypoxic (85% RA) at home. o2 sat 98% NRBM, pt 2 months post CABG. ekg done in triage. BIBA c/o sudden onset SOB, nausea, chest discomfort. Per EMS pt found to be hypertensive & hypoxic (85% RA) at home. o2 sat 98% NRBM, pt 2 months post CABG. ekg done in triage.

## 2021-07-14 NOTE — ED PROVIDER NOTE - PATIENT PORTAL LINK FT
You can access the FollowMyHealth Patient Portal offered by [facility] by registering at the following website: [email or web address]. By joining [employer]’s FollowMyHealth portal, you will also be able to view your health information using other applications (apps) compatible with our system.

## 2021-07-14 NOTE — ED ADULT NURSE NOTE - CHIEF COMPLAINT QUOTE
BIBA c/o sudden onset SOB, nausea, chest discomfort. Per EMS pt found to be hypertensive & hypoxic (85% RA) at home. o2 sat 98% NRBM, pt 2 months post CABG. ekg done in triage.

## 2021-07-14 NOTE — ED PROVIDER NOTE - NS ED ATTENDING STATEMENT MOD
Attending Only I have personally seen and examined this patient.  I have fully participated in the care of this patient. I have reviewed all pertinent clinical information, including history, physical exam, plan and the Medical/PA/NP Student and Resident’s note and agree except as noted. I have personally seen and examined this patient. I have fully participated in the care of this patient. I have reviewed all pertinent clinical information, including history, physical exam, plan and the Medical/PA/NP Student's note and agree except as noted.

## 2021-07-14 NOTE — ED PROVIDER NOTE - PMH
At risk for sleep apnea  Bang 5  CAD (coronary artery disease)    Congestive heart failure    Dementia    H/O myocardial ischemia    Hyperlipemia    Hypertension    Type 2 diabetes mellitus

## 2021-07-14 NOTE — ED PROVIDER NOTE - PHYSICAL EXAMINATION
VITAL SIGNS: I have reviewed nursing notes and confirm.  CONSTITUTIONAL:  in no acute distress.  SKIN: Skin exam is warm and dry, no acute rash.  HEAD: Normocephalic; atraumatic.  EYES: PERRL, EOM intact; conjunctiva and sclera clear.  ENT: No nasal discharge; airway clear. Throat clear.  NECK: Supple; non tender.    CARD: Regular rate and rhythm.  RESP: No wheezes,  no rales or rhonchi.   ABD:  soft; non-distended; non-tender;   EXT: Normal ROM. No clubbing, cyanosis or edema.  NEURO: Alert, oriented. Grossly unremarkable. No focal deficits.  moves all extremities,  normal gait   PSYCH: Cooperative, appropriate.

## 2021-07-14 NOTE — ED PROVIDER NOTE - OBJECTIVE STATEMENT
76 year old female with hx of diabetes and triple bypass surgery presents to ED reporting that at 1500 today she got a sore throat and tried to alleviate it with tea, without success. She reports that this is the same feeling she had prior to needing the triple bypass. She denies any chest pain, shortness of breath, syncope, or falls. 76 year old female with hx of diabetes and triple bypass surgery presents to ED reporting that at 1500 today she got a sore throat and tried to alleviate it with tea, without success. She reports that this is the same feeling she had prior to needing the triple bypass. She denies any chest pain, shortness of breath, syncope, or falls.    ekg sinus at 104 no chnages

## 2021-07-23 NOTE — ED ADULT NURSE NOTE - BEFAST SPEECH
PCP: Aime Rios   Referring Provider: Trinidad Hansen  Referring Diagnosis: CKD    GFR/Date: 17 (7/23/21)    Is patient under the age of 65? Y  Is patient diabetic? Y  Is patient on insulin? Y  Was patient offered a pancreas transplant referral? Y    Is patient in a group home/assisted living? N  Does patient have a guardian? N    Referral intake process completed.  Patient is aware that after financial approval is received, medical records will be requested.   Patient confirmed for a callback from transplant coordinator on 7/27/21 between 1-3. (within 2 weeks)  Tentative evaluation date 9/27. (within 4 weeks)    Confirmed coordinator will discuss evaluation process in more detail at the time of their call.   Patient is aware of the need to arrange age appropriate cancer screening, vaccinations, and dental care.  Reminded patient to complete questionnaire, complete medical records release, and review packet prior to evaluation visit .  Assessed patient for special needs (ie-wheelchair, assistance, guardian, and ):  n   Patient instructed to call [phone] with questions.     Patient gave verbal consent during intake call to obtain medical records and documents outside of [facility]:  YES              Yes

## 2021-08-23 ENCOUNTER — APPOINTMENT (OUTPATIENT)
Dept: CARDIOLOGY | Facility: CLINIC | Age: 76
End: 2021-08-23

## 2021-09-24 ENCOUNTER — APPOINTMENT (OUTPATIENT)
Dept: PULMONOLOGY | Facility: CLINIC | Age: 76
End: 2021-09-24
Payer: MEDICARE

## 2021-09-24 VITALS
HEART RATE: 73 BPM | HEIGHT: 58 IN | BODY MASS INDEX: 23.3 KG/M2 | WEIGHT: 111 LBS | SYSTOLIC BLOOD PRESSURE: 122 MMHG | OXYGEN SATURATION: 95 % | DIASTOLIC BLOOD PRESSURE: 70 MMHG

## 2021-09-24 PROCEDURE — 99204 OFFICE O/P NEW MOD 45 MIN: CPT

## 2021-09-24 RX ORDER — AMIODARONE HYDROCHLORIDE 200 MG/1
200 TABLET ORAL
Refills: 0 | Status: DISCONTINUED | COMMUNITY
End: 2021-09-24

## 2021-09-24 RX ORDER — METOPROLOL SUCCINATE 25 MG/1
25 TABLET, EXTENDED RELEASE ORAL
Qty: 90 | Refills: 2 | Status: DISCONTINUED | COMMUNITY
End: 2021-09-24

## 2021-09-24 RX ORDER — METOPROLOL TARTRATE 25 MG/1
25 TABLET, FILM COATED ORAL
Qty: 180 | Refills: 0 | Status: DISCONTINUED | COMMUNITY
Start: 2021-06-03 | End: 2021-09-24

## 2021-09-24 RX ORDER — SITAGLIPTIN AND METFORMIN HYDROCHLORIDE 50; 1000 MG/1; MG/1
50-1000 TABLET, FILM COATED, EXTENDED RELEASE ORAL
Qty: 60 | Refills: 0 | Status: DISCONTINUED | COMMUNITY
Start: 2021-05-25 | End: 2021-09-24

## 2021-09-24 RX ORDER — INSULIN GLARGINE 100 [IU]/ML
100 INJECTION, SOLUTION SUBCUTANEOUS
Refills: 0 | Status: DISCONTINUED | COMMUNITY
End: 2021-09-24

## 2021-09-24 RX ORDER — POTASSIUM CHLORIDE 20 MEQ/15ML
20 MEQ/15ML SOLUTION ORAL
Qty: 210 | Refills: 0 | Status: DISCONTINUED | COMMUNITY
Start: 2021-04-29 | End: 2021-09-24

## 2021-09-24 RX ORDER — INSULIN LISPRO 100 [IU]/ML
100 INJECTION, SOLUTION INTRAVENOUS; SUBCUTANEOUS
Refills: 0 | Status: DISCONTINUED | COMMUNITY
End: 2021-09-24

## 2021-09-24 RX ORDER — ATORVASTATIN CALCIUM 80 MG/1
80 TABLET, FILM COATED ORAL
Refills: 0 | Status: DISCONTINUED | COMMUNITY
End: 2021-09-24

## 2021-09-24 RX ORDER — CLOPIDOGREL 75 MG/1
75 TABLET, FILM COATED ORAL
Qty: 30 | Refills: 0 | Status: DISCONTINUED | COMMUNITY
End: 2021-09-24

## 2021-09-27 ENCOUNTER — OUTPATIENT (OUTPATIENT)
Dept: OUTPATIENT SERVICES | Facility: HOSPITAL | Age: 76
LOS: 1 days | End: 2021-09-27

## 2021-09-27 ENCOUNTER — APPOINTMENT (OUTPATIENT)
Dept: CT IMAGING | Facility: CLINIC | Age: 76
End: 2021-09-27
Payer: MEDICARE

## 2021-09-27 DIAGNOSIS — R91.8 OTHER NONSPECIFIC ABNORMAL FINDING OF LUNG FIELD: ICD-10-CM

## 2021-09-27 DIAGNOSIS — Z98.891 HISTORY OF UTERINE SCAR FROM PREVIOUS SURGERY: Chronic | ICD-10-CM

## 2021-09-27 PROCEDURE — 71250 CT THORAX DX C-: CPT | Mod: 26

## 2021-10-11 ENCOUNTER — RESULT CHARGE (OUTPATIENT)
Age: 76
End: 2021-10-11

## 2021-10-11 ENCOUNTER — APPOINTMENT (OUTPATIENT)
Dept: ENDOCRINOLOGY | Facility: CLINIC | Age: 76
End: 2021-10-11
Payer: MEDICARE

## 2021-10-11 VITALS
HEART RATE: 83 BPM | SYSTOLIC BLOOD PRESSURE: 140 MMHG | DIASTOLIC BLOOD PRESSURE: 70 MMHG | TEMPERATURE: 98.5 F | HEIGHT: 58 IN | BODY MASS INDEX: 24.14 KG/M2 | WEIGHT: 115 LBS | RESPIRATION RATE: 16 BRPM | OXYGEN SATURATION: 96 %

## 2021-10-11 LAB
GLUCOSE BLDC GLUCOMTR-MCNC: 284
HBA1C MFR BLD HPLC: 9.5

## 2021-10-11 PROCEDURE — 99204 OFFICE O/P NEW MOD 45 MIN: CPT | Mod: 25

## 2021-10-11 PROCEDURE — 83036 HEMOGLOBIN GLYCOSYLATED A1C: CPT | Mod: QW

## 2021-10-11 PROCEDURE — 82962 GLUCOSE BLOOD TEST: CPT

## 2021-10-22 ENCOUNTER — APPOINTMENT (OUTPATIENT)
Dept: PULMONOLOGY | Facility: CLINIC | Age: 76
End: 2021-10-22

## 2021-11-05 ENCOUNTER — APPOINTMENT (OUTPATIENT)
Dept: ENDOCRINOLOGY | Facility: CLINIC | Age: 76
End: 2021-11-05

## 2021-11-30 ENCOUNTER — APPOINTMENT (OUTPATIENT)
Dept: PULMONOLOGY | Facility: CLINIC | Age: 76
End: 2021-11-30
Payer: MEDICARE

## 2021-11-30 VITALS
SYSTOLIC BLOOD PRESSURE: 120 MMHG | BODY MASS INDEX: 23.62 KG/M2 | DIASTOLIC BLOOD PRESSURE: 60 MMHG | OXYGEN SATURATION: 95 % | WEIGHT: 113 LBS | HEART RATE: 69 BPM

## 2021-11-30 DIAGNOSIS — R91.8 OTHER NONSPECIFIC ABNORMAL FINDING OF LUNG FIELD: ICD-10-CM

## 2021-11-30 DIAGNOSIS — R06.02 SHORTNESS OF BREATH: ICD-10-CM

## 2021-11-30 DIAGNOSIS — G47.33 OBSTRUCTIVE SLEEP APNEA (ADULT) (PEDIATRIC): ICD-10-CM

## 2021-11-30 PROCEDURE — 99214 OFFICE O/P EST MOD 30 MIN: CPT

## 2021-12-06 ENCOUNTER — NON-APPOINTMENT (OUTPATIENT)
Age: 76
End: 2021-12-06

## 2021-12-06 ENCOUNTER — APPOINTMENT (OUTPATIENT)
Dept: CARDIOLOGY | Facility: CLINIC | Age: 76
End: 2021-12-06
Payer: MEDICARE

## 2021-12-06 VITALS
BODY MASS INDEX: 24.14 KG/M2 | SYSTOLIC BLOOD PRESSURE: 140 MMHG | DIASTOLIC BLOOD PRESSURE: 60 MMHG | TEMPERATURE: 98.8 F | WEIGHT: 115 LBS | HEIGHT: 58 IN | OXYGEN SATURATION: 95 % | HEART RATE: 72 BPM

## 2021-12-06 PROCEDURE — 93000 ELECTROCARDIOGRAM COMPLETE: CPT

## 2021-12-06 PROCEDURE — 99214 OFFICE O/P EST MOD 30 MIN: CPT

## 2021-12-06 RX ORDER — FUROSEMIDE 40 MG/1
40 TABLET ORAL DAILY
Qty: 90 | Refills: 2 | Status: DISCONTINUED | COMMUNITY
Start: 2021-05-24 | End: 2021-12-06

## 2021-12-06 RX ORDER — POTASSIUM CHLORIDE 750 MG/1
10 TABLET, EXTENDED RELEASE ORAL DAILY
Refills: 0 | Status: DISCONTINUED | COMMUNITY
Start: 2021-05-24 | End: 2021-12-06

## 2021-12-06 RX ORDER — AMLODIPINE BESYLATE 5 MG/1
5 TABLET ORAL
Qty: 90 | Refills: 2 | Status: DISCONTINUED | COMMUNITY
End: 2021-12-06

## 2021-12-06 NOTE — CARDIOLOGY SUMMARY
[de-identified] : 5/24/2021\par Sinus  Rhythm \par -Right bundle branch block. \par  -Left atrial enlargement. \par ABNORMAL \par

## 2021-12-06 NOTE — HISTORY OF PRESENT ILLNESS
[FreeTextEntry1] : Son in attendance\par \par CAD\par CHRONIC SYSTOLIC HEART FAILURE\par \par s/p  CABG x 3 (LIMA-LAD, SVG-Diag, SVG-PDA) Endarterectomy of the LAD and she is here for a post-op visit. \par Surgery Date: 4/19/21 \par \par Echo 5/26/20231\par LVEF 45-50%. DD 2. LADP = 25 mmHg. Mild MR. Mild TR. RASP 43 mmHg. \par \par Admits frequency in urine\par Denied dyspnea, orthopnea, pnd, edema, cp , palpitation, syncope, near syncope. \par \par Current Cardio Meds\par Lasix 40 mg daily\par Potassium Chloride ER 10 MEQ daily\par Amiodarone HCl - 200 MG daily (Already completed) (Post Op FIDELINA) \par amlodipine  5 MG daily\par Lipitor 80 MG daily\par Plavix 75 MG daily (Complete, used  Plavix for 3 months post-op)\par Toprol XL 25 MG daily \par  mgh daily (ASA 81 mng daily recommended) \par \par FUNCTIONAL CAPACITY\par Walks w/o assistance\par Walks in the house\par \par CHRONIC, STABLE CONDITIONS\par CAD: s/p CABG 4/2021\par HTN\par CHF\par Post Op FIDELINA: During hospital stay she had 4/23 bradycardia down and require pacing medications adjusted as per EP. Still overnight intermittent bradycardia requiring back p pacing, 4/26: Home EP cleared for D/C no indication for PPM. [facility]OT.\par Echo 3/23/2021  LVEF 35-40. \par LHC 3/23/2021: LVEF 40%.Severe CAD.. \par

## 2021-12-16 RX ORDER — ASPIRIN ENTERIC COATED TABLETS 81 MG 81 MG/1
81 TABLET, DELAYED RELEASE ORAL
Qty: 90 | Refills: 0 | Status: ACTIVE | COMMUNITY
Start: 2021-12-06 | End: 1900-01-01

## 2022-01-04 ENCOUNTER — NON-APPOINTMENT (OUTPATIENT)
Age: 77
End: 2022-01-04

## 2022-01-06 ENCOUNTER — RX RENEWAL (OUTPATIENT)
Age: 77
End: 2022-01-06

## 2022-01-11 ENCOUNTER — LABORATORY RESULT (OUTPATIENT)
Age: 77
End: 2022-01-11

## 2022-01-20 ENCOUNTER — APPOINTMENT (OUTPATIENT)
Dept: ENDOCRINOLOGY | Facility: CLINIC | Age: 77
End: 2022-01-20

## 2022-01-21 ENCOUNTER — APPOINTMENT (OUTPATIENT)
Dept: ENDOCRINOLOGY | Facility: CLINIC | Age: 77
End: 2022-01-21
Payer: MEDICARE

## 2022-01-21 VITALS
HEART RATE: 72 BPM | SYSTOLIC BLOOD PRESSURE: 126 MMHG | HEIGHT: 58 IN | OXYGEN SATURATION: 96 % | TEMPERATURE: 98.2 F | RESPIRATION RATE: 15 BRPM | DIASTOLIC BLOOD PRESSURE: 74 MMHG | WEIGHT: 117 LBS | BODY MASS INDEX: 24.56 KG/M2

## 2022-01-21 DIAGNOSIS — F32.A DEPRESSION, UNSPECIFIED: ICD-10-CM

## 2022-01-21 LAB — HBA1C MFR BLD HPLC: 10

## 2022-01-21 PROCEDURE — 97802 MEDICAL NUTRITION INDIV IN: CPT

## 2022-01-21 PROCEDURE — 95249 CONT GLUC MNTR PT PROV EQP: CPT

## 2022-01-21 PROCEDURE — 83036 HEMOGLOBIN GLYCOSYLATED A1C: CPT | Mod: QW

## 2022-01-21 PROCEDURE — 99214 OFFICE O/P EST MOD 30 MIN: CPT | Mod: 25

## 2022-01-31 ENCOUNTER — APPOINTMENT (OUTPATIENT)
Dept: CARDIOLOGY | Facility: CLINIC | Age: 77
End: 2022-01-31

## 2022-02-04 ENCOUNTER — APPOINTMENT (OUTPATIENT)
Dept: ENDOCRINOLOGY | Facility: CLINIC | Age: 77
End: 2022-02-04
Payer: MEDICARE

## 2022-02-04 PROCEDURE — 97803 MED NUTRITION INDIV SUBSEQ: CPT

## 2022-02-07 ENCOUNTER — APPOINTMENT (OUTPATIENT)
Dept: CARDIOLOGY | Facility: CLINIC | Age: 77
End: 2022-02-07

## 2022-02-07 LAB
ANION GAP SERPL CALC-SCNC: 15 MMOL/L
BUN SERPL-MCNC: 27 MG/DL
CALCIUM SERPL-MCNC: 9.6 MG/DL
CHLORIDE SERPL-SCNC: 104 MMOL/L
CO2 SERPL-SCNC: 26 MMOL/L
CREAT SERPL-MCNC: 1.04 MG/DL
GLUCOSE SERPL-MCNC: 269 MG/DL
MAGNESIUM SERPL-MCNC: 2 MG/DL
NT-PROBNP SERPL-MCNC: 2452 PG/ML
POTASSIUM SERPL-SCNC: 4.2 MMOL/L
SODIUM SERPL-SCNC: 144 MMOL/L

## 2022-03-14 ENCOUNTER — NON-APPOINTMENT (OUTPATIENT)
Age: 77
End: 2022-03-14

## 2022-03-14 ENCOUNTER — APPOINTMENT (OUTPATIENT)
Dept: CARDIOLOGY | Facility: CLINIC | Age: 77
End: 2022-03-14
Payer: MEDICARE

## 2022-03-14 VITALS
DIASTOLIC BLOOD PRESSURE: 70 MMHG | OXYGEN SATURATION: 97 % | HEART RATE: 67 BPM | BODY MASS INDEX: 23.76 KG/M2 | HEIGHT: 58 IN | TEMPERATURE: 98.7 F | WEIGHT: 113.2 LBS | SYSTOLIC BLOOD PRESSURE: 122 MMHG

## 2022-03-14 DIAGNOSIS — R07.9 CHEST PAIN, UNSPECIFIED: ICD-10-CM

## 2022-03-14 PROCEDURE — 93000 ELECTROCARDIOGRAM COMPLETE: CPT

## 2022-03-14 PROCEDURE — 99215 OFFICE O/P EST HI 40 MIN: CPT

## 2022-03-14 RX ORDER — METOPROLOL TARTRATE 25 MG/1
25 TABLET, FILM COATED ORAL
Qty: 90 | Refills: 0 | Status: DISCONTINUED | COMMUNITY
Start: 2021-06-03 | End: 2022-03-14

## 2022-03-14 RX ORDER — GLIMEPIRIDE 4 MG/1
4 TABLET ORAL
Qty: 180 | Refills: 0 | Status: DISCONTINUED | COMMUNITY
Start: 2021-10-11 | End: 2022-03-14

## 2022-03-15 PROBLEM — R07.9 CHEST PAIN: Status: ACTIVE | Noted: 2022-03-14

## 2022-04-02 ENCOUNTER — NON-APPOINTMENT (OUTPATIENT)
Age: 77
End: 2022-04-02

## 2022-04-04 ENCOUNTER — RX RENEWAL (OUTPATIENT)
Age: 77
End: 2022-04-04

## 2022-06-15 NOTE — PATIENT PROFILE ADULT - NS PRO AD ANY ON CHART
Pt arrives with nephew after having a nose bleed for two days. Pt was seen at  [facility] in [place] last night and nasal packing was inserted into L nostril. Pt has not made ENT appt and here because the blood is coming out of the right side. Hx or HTN and takes baby aspirin daily. No

## 2022-08-15 NOTE — H&P PST ADULT - BSA (M2)
1.41 Olumiant Pregnancy And Lactation Text: Based on animal studies, Olumiant may cause embryo-fetal harm when administered to pregnant women.  The medication should not be used in pregnancy.  Breastfeeding is not recommended during treatment.

## 2022-08-22 ENCOUNTER — NON-APPOINTMENT (OUTPATIENT)
Age: 77
End: 2022-08-22

## 2022-08-22 NOTE — REASON FOR VISIT
[FreeTextEntry1] : I returned pt's call. I spoke to her son Leisa. Pt was at [facility] with Stroke and now she is discharged. She will FU at PCP office. Son stated he will call our office to make a FU appt.

## 2022-09-01 ENCOUNTER — APPOINTMENT (OUTPATIENT)
Dept: ENDOCRINOLOGY | Facility: CLINIC | Age: 77
End: 2022-09-01

## 2022-09-01 VITALS
OXYGEN SATURATION: 98 % | DIASTOLIC BLOOD PRESSURE: 66 MMHG | SYSTOLIC BLOOD PRESSURE: 124 MMHG | RESPIRATION RATE: 14 BRPM | BODY MASS INDEX: 22.88 KG/M2 | HEART RATE: 64 BPM | TEMPERATURE: 97.2 F | HEIGHT: 58 IN | WEIGHT: 109 LBS

## 2022-09-01 LAB — HBA1C MFR BLD HPLC: 8.6

## 2022-09-01 PROCEDURE — 99214 OFFICE O/P EST MOD 30 MIN: CPT | Mod: 25

## 2022-09-01 PROCEDURE — 83036 HEMOGLOBIN GLYCOSYLATED A1C: CPT | Mod: QW

## 2022-09-13 ENCOUNTER — APPOINTMENT (OUTPATIENT)
Dept: ENDOCRINOLOGY | Facility: CLINIC | Age: 77
End: 2022-09-13

## 2022-09-13 PROCEDURE — 95249 CONT GLUC MNTR PT PROV EQP: CPT

## 2022-09-13 PROCEDURE — 97803 MED NUTRITION INDIV SUBSEQ: CPT

## 2022-09-22 ENCOUNTER — NON-APPOINTMENT (OUTPATIENT)
Age: 77
End: 2022-09-22

## 2022-09-22 LAB
ANION GAP SERPL CALC-SCNC: 15 MMOL/L
BUN SERPL-MCNC: 28 MG/DL
CALCIUM SERPL-MCNC: 9.4 MG/DL
CHLORIDE SERPL-SCNC: 108 MMOL/L
CHOLEST SERPL-MCNC: 113 MG/DL
CO2 SERPL-SCNC: 23 MMOL/L
CREAT SERPL-MCNC: 0.84 MG/DL
CREAT SPEC-SCNC: 58 MG/DL
EGFR: 72 ML/MIN/1.73M2
ESTIMATED AVERAGE GLUCOSE: 192 MG/DL
GLUCOSE SERPL-MCNC: 161 MG/DL
HBA1C MFR BLD HPLC: 8.3 %
HDLC SERPL-MCNC: 39 MG/DL
LDLC SERPL CALC-MCNC: 56 MG/DL
MICROALBUMIN 24H UR DL<=1MG/L-MCNC: 28.9 MG/DL
MICROALBUMIN/CREAT 24H UR-RTO: 494 MG/G
NONHDLC SERPL-MCNC: 75 MG/DL
POTASSIUM SERPL-SCNC: 4.2 MMOL/L
SODIUM SERPL-SCNC: 146 MMOL/L
TRIGL SERPL-MCNC: 91 MG/DL

## 2022-10-14 ENCOUNTER — APPOINTMENT (OUTPATIENT)
Dept: ENDOCRINOLOGY | Facility: CLINIC | Age: 77
End: 2022-10-14

## 2022-10-14 PROCEDURE — 97803 MED NUTRITION INDIV SUBSEQ: CPT

## 2022-11-07 ENCOUNTER — APPOINTMENT (OUTPATIENT)
Dept: CARDIOLOGY | Facility: CLINIC | Age: 77
End: 2022-11-07

## 2022-11-18 ENCOUNTER — APPOINTMENT (OUTPATIENT)
Dept: ENDOCRINOLOGY | Facility: CLINIC | Age: 77
End: 2022-11-18

## 2022-11-28 ENCOUNTER — NON-APPOINTMENT (OUTPATIENT)
Age: 77
End: 2022-11-28

## 2022-12-09 ENCOUNTER — APPOINTMENT (OUTPATIENT)
Dept: ENDOCRINOLOGY | Facility: CLINIC | Age: 77
End: 2022-12-09

## 2023-02-03 NOTE — BRIEF OPERATIVE NOTE - NS MD BRIEF OPTUBE MEDIASTINAL NUM
The patient has been examined and the H&P has been reviewed:    I concur with the findings and no changes have occurred since H&P was written.    Procedure risks, benefits and alternative options discussed and understood by patient/family.    ASA 2, mallampati 2        There are no hospital problems to display for this patient.     2

## 2023-02-27 ENCOUNTER — NON-APPOINTMENT (OUTPATIENT)
Age: 78
End: 2023-02-27

## 2023-03-22 NOTE — DIETITIAN INITIAL EVALUATION ADULT. - ENERGY INTAKE
Fair (>50%)
Products Recommended: Cerave
General Sunscreen Counseling: I recommended a broad spectrum sunscreen with a SPF of 30 or higher. I explained that SPF 30 sunscreens block approximately 97 percent of the sun's harmful rays. Sunscreens should be applied at least 15 minutes prior to expected sun exposure and then every 2 hours after that as long as sun exposure continues. If swimming or exercising sunscreen should be reapplied every 45 minutes to an hour after getting wet or sweating. One ounce, or the equivalent of a shot glass full of sunscreen, is adequate to protect the skin not covered by a bathing suit. I also recommended a lip balm with a sunscreen as well. Sun protective clothing can be used in lieu of sunscreen but must be worn the entire time you are exposed to the sun's rays.
Detail Level: Detailed

## 2023-04-11 NOTE — PROCEDURE NOTE - NSPOSTPRCRAD_GEN_A_CORE
Consent: The risks of atrophy were reviewed with the patient. central line located in the superior vena cava

## 2023-04-20 ENCOUNTER — NON-APPOINTMENT (OUTPATIENT)
Age: 78
End: 2023-04-20

## 2023-04-20 ENCOUNTER — APPOINTMENT (OUTPATIENT)
Dept: CARDIOLOGY | Facility: CLINIC | Age: 78
End: 2023-04-20
Payer: MEDICARE

## 2023-04-20 VITALS
DIASTOLIC BLOOD PRESSURE: 62 MMHG | OXYGEN SATURATION: 95 % | HEIGHT: 58 IN | WEIGHT: 111 LBS | SYSTOLIC BLOOD PRESSURE: 128 MMHG | HEART RATE: 70 BPM | BODY MASS INDEX: 23.3 KG/M2

## 2023-04-20 DIAGNOSIS — G45.9 TRANSIENT CEREBRAL ISCHEMIC ATTACK, UNSPECIFIED: ICD-10-CM

## 2023-04-20 PROCEDURE — 99215 OFFICE O/P EST HI 40 MIN: CPT

## 2023-04-20 PROCEDURE — 93000 ELECTROCARDIOGRAM COMPLETE: CPT

## 2023-04-20 RX ORDER — SERTRALINE HYDROCHLORIDE 50 MG/1
50 TABLET, FILM COATED ORAL DAILY
Refills: 0 | Status: DISCONTINUED | COMMUNITY
Start: 2021-12-06 | End: 2023-04-20

## 2023-04-20 RX ORDER — ATORVASTATIN CALCIUM 40 MG/1
40 TABLET, FILM COATED ORAL
Refills: 0 | Status: DISCONTINUED | COMMUNITY
End: 2023-04-20

## 2023-04-20 RX ORDER — CLOPIDOGREL 75 MG/1
75 TABLET, FILM COATED ORAL
Refills: 0 | Status: DISCONTINUED | COMMUNITY
End: 2023-04-20

## 2023-04-26 ENCOUNTER — NON-APPOINTMENT (OUTPATIENT)
Age: 78
End: 2023-04-26

## 2023-04-26 LAB
ALBUMIN SERPL ELPH-MCNC: 3.9 G/DL
ALP BLD-CCNC: 75 U/L
ALT SERPL-CCNC: 12 U/L
ANION GAP SERPL CALC-SCNC: 13 MMOL/L
AST SERPL-CCNC: 13 U/L
BASOPHILS # BLD AUTO: 0.11 K/UL
BASOPHILS NFR BLD AUTO: 1.3 %
BILIRUB SERPL-MCNC: 0.4 MG/DL
BUN SERPL-MCNC: 18 MG/DL
CALCIUM SERPL-MCNC: 9.6 MG/DL
CHLORIDE SERPL-SCNC: 103 MMOL/L
CHOLEST SERPL-MCNC: 174 MG/DL
CO2 SERPL-SCNC: 26 MMOL/L
CREAT SERPL-MCNC: 0.87 MG/DL
EGFR: 68 ML/MIN/1.73M2
EOSINOPHIL # BLD AUTO: 0.42 K/UL
EOSINOPHIL NFR BLD AUTO: 4.9 %
ESTIMATED AVERAGE GLUCOSE: 160 MG/DL
GLUCOSE SERPL-MCNC: 133 MG/DL
HBA1C MFR BLD HPLC: 7.2 %
HCT VFR BLD CALC: 41.5 %
HDLC SERPL-MCNC: 41 MG/DL
HGB BLD-MCNC: 13.2 G/DL
IMM GRANULOCYTES NFR BLD AUTO: 0.5 %
LDLC SERPL CALC-MCNC: 106 MG/DL
LYMPHOCYTES # BLD AUTO: 1.76 K/UL
LYMPHOCYTES NFR BLD AUTO: 20.4 %
MAGNESIUM SERPL-MCNC: 1.9 MG/DL
MAN DIFF?: NORMAL
MCHC RBC-ENTMCNC: 28.8 PG
MCHC RBC-ENTMCNC: 31.8 GM/DL
MCV RBC AUTO: 90.4 FL
MONOCYTES # BLD AUTO: 0.89 K/UL
MONOCYTES NFR BLD AUTO: 10.3 %
NEUTROPHILS # BLD AUTO: 5.4 K/UL
NEUTROPHILS NFR BLD AUTO: 62.6 %
NONHDLC SERPL-MCNC: 133 MG/DL
PLATELET # BLD AUTO: 312 K/UL
POTASSIUM SERPL-SCNC: 3.8 MMOL/L
PROT SERPL-MCNC: 6.5 G/DL
RBC # BLD: 4.59 M/UL
RBC # FLD: 13 %
SODIUM SERPL-SCNC: 142 MMOL/L
TRIGL SERPL-MCNC: 136 MG/DL
WBC # FLD AUTO: 8.62 K/UL

## 2023-04-26 RX ORDER — ROSUVASTATIN CALCIUM 10 MG/1
10 TABLET, FILM COATED ORAL
Qty: 90 | Refills: 1 | Status: ACTIVE | COMMUNITY
Start: 2023-04-26 | End: 1900-01-01

## 2023-05-02 ENCOUNTER — APPOINTMENT (OUTPATIENT)
Dept: CARDIOLOGY | Facility: CLINIC | Age: 78
End: 2023-05-02

## 2023-05-05 ENCOUNTER — APPOINTMENT (OUTPATIENT)
Dept: CARDIOLOGY | Facility: CLINIC | Age: 78
End: 2023-05-05

## 2023-05-22 ENCOUNTER — APPOINTMENT (OUTPATIENT)
Dept: CARDIOLOGY | Facility: CLINIC | Age: 78
End: 2023-05-22
Payer: MEDICARE

## 2023-05-22 VITALS
OXYGEN SATURATION: 96 % | WEIGHT: 111 LBS | HEIGHT: 58 IN | HEART RATE: 60 BPM | BODY MASS INDEX: 23.3 KG/M2 | DIASTOLIC BLOOD PRESSURE: 73 MMHG | SYSTOLIC BLOOD PRESSURE: 136 MMHG | TEMPERATURE: 98.1 F

## 2023-05-22 DIAGNOSIS — I63.9 CEREBRAL INFARCTION, UNSPECIFIED: ICD-10-CM

## 2023-05-22 DIAGNOSIS — I50.9 HEART FAILURE, UNSPECIFIED: ICD-10-CM

## 2023-05-22 DIAGNOSIS — I49.3 VENTRICULAR PREMATURE DEPOLARIZATION: ICD-10-CM

## 2023-05-22 PROCEDURE — 99215 OFFICE O/P EST HI 40 MIN: CPT

## 2023-05-22 PROCEDURE — 93000 ELECTROCARDIOGRAM COMPLETE: CPT

## 2023-05-23 PROBLEM — I63.9 CVA (CEREBRAL VASCULAR ACCIDENT): Status: ACTIVE | Noted: 2023-04-26

## 2023-05-23 PROBLEM — I49.3 FREQUENT PVCS: Status: ACTIVE | Noted: 2023-04-20

## 2023-05-23 PROBLEM — I50.9 CHF (CONGESTIVE HEART FAILURE): Status: ACTIVE | Noted: 2021-05-24

## 2023-06-01 ENCOUNTER — NON-APPOINTMENT (OUTPATIENT)
Age: 78
End: 2023-06-01

## 2023-06-14 ENCOUNTER — NON-APPOINTMENT (OUTPATIENT)
Age: 78
End: 2023-06-14

## 2023-06-21 ENCOUNTER — APPOINTMENT (OUTPATIENT)
Dept: ENDOCRINOLOGY | Facility: CLINIC | Age: 78
End: 2023-06-21

## 2023-07-07 ENCOUNTER — RESULT CHARGE (OUTPATIENT)
Age: 78
End: 2023-07-07

## 2023-07-07 ENCOUNTER — APPOINTMENT (OUTPATIENT)
Dept: ENDOCRINOLOGY | Facility: CLINIC | Age: 78
End: 2023-07-07
Payer: MEDICARE

## 2023-07-07 VITALS
OXYGEN SATURATION: 95 % | HEIGHT: 58 IN | SYSTOLIC BLOOD PRESSURE: 148 MMHG | BODY MASS INDEX: 23.3 KG/M2 | HEART RATE: 62 BPM | WEIGHT: 111 LBS | DIASTOLIC BLOOD PRESSURE: 72 MMHG

## 2023-07-07 LAB — GLUCOSE BLDC GLUCOMTR-MCNC: 134

## 2023-07-07 PROCEDURE — 99214 OFFICE O/P EST MOD 30 MIN: CPT | Mod: 25

## 2023-07-07 PROCEDURE — 95251 CONT GLUC MNTR ANALYSIS I&R: CPT

## 2023-07-07 PROCEDURE — 82962 GLUCOSE BLOOD TEST: CPT

## 2023-07-07 RX ORDER — DOCUSATE SODIUM 100 MG/1
100 CAPSULE ORAL DAILY
Qty: 90 | Refills: 0 | Status: ACTIVE | COMMUNITY
Start: 2023-07-07 | End: 1900-01-01

## 2023-07-07 NOTE — PHYSICAL EXAM
[Alert] : alert [Well Nourished] : well nourished [No Acute Distress] : no acute distress [Normal Sclera/Conjunctiva] : normal sclera/conjunctiva [Normal Hearing] : hearing was normal [Thyroid Not Enlarged] : the thyroid was not enlarged [No Accessory Muscle Use] : no accessory muscle use [Clear to Auscultation] : lungs were clear to auscultation bilaterally [No Edema] : no peripheral edema [Normal Gait] : normal gait [No Rash] : no rash [No Tremors] : no tremors [de-identified] : irregular rhythm on asusculation [de-identified] : h/o dementia

## 2023-07-07 NOTE — ASSESSMENT
[FreeTextEntry1] : Pt son requesting a neurologist and pcp through [facility]\par \par T2DM\par -Pt appears to be doing better but likely not a candidate for tight control due to "voices in head" telling her to eat. She even has a dexcom to show her sugar isnt low but continues to panic correct blood sugars with spoonfulls of sugar. Also does not eat the best bfast choice (waffles/syurp) but is not likely to change. Son agrees\par -Continue Janument 1 tab daily\par -No longer requiring insulin (son self d.c)\par -Continue dexcom use\par -Must follow up with ophtho and podiatry \par \par HLD\par -Continue statin as per cardiology\par \par RTO 3-4 months MD

## 2023-07-07 NOTE — REVIEW OF SYSTEMS
[Fatigue] : no fatigue [Recent Weight Gain (___ Lbs)] : no recent weight gain [Recent Weight Loss (___ Lbs)] : no recent weight loss [Visual Field Defect] : no visual field defect [Blurred Vision] : no blurred vision [Dysphagia] : no dysphagia [Neck Pain] : no neck pain [Dysphonia] : no dysphonia [Chest Pain] : no chest pain [Shortness Of Breath] : no shortness of breath [Constipation] : constipation [Diarrhea] : no diarrhea [Polyuria] : no polyuria [Polydipsia] : polydipsia [FreeTextEntry7] : at times [de-identified] : hallucinations

## 2023-07-07 NOTE — HISTORY OF PRESENT ILLNESS
[FreeTextEntry1] : Pt lost to follow up since 2022\par Interval history: Pt on plavix, may have had CVA\par Richelle has prescribed zolof and resperdal but son hesistant for resperdal\par DM control much better\par \par Kinsey is a 78 yr old female, who presents today for follow up in regards type 2 diabetes. \par She does not speak good english, Malay speaking , son accompanying.\par \par Intial visit: \par Per patient , has been diabetic since many years.\par Per son she has some hallucinations, she hears voices , and was eating sugar and not taking her meds.\par He says she hides food in her room thinking someone is going to poison it and then they don’t know when she eats, she does not sleep, wakes up many times at night and snacks.\par Was on glimepiride 4 mg daily but she she was nottaking it regularly\par In past- we recommended to start janumet 50/1000 daily , first was nto taking it regularly too, son wants to give her, but he says she was very stubborn , now doing it regularly.\par \par Current regimen\par Janument 50/1000 mg once daily\par Lantus 14 units QHS (has not used in months) \par \par current severity: uncontrolled, getting better\par A1C 7.2- 4/2023\par \par \par Home Glucose Monitoring\par DEXCOM\par Average glucose 199\par GMI 8.1\par Standard deviation 47\par \par 14% Very high\par 47% High\par 39% In range\par 0^% Low\par 0% Very low\par \par Highest 6a-9a-----when she is eating waffles and syrup\par \par ''This patient with diabetes TYPE 2 performs 4 glucose checks per day utilizing a continuous glucose monitor. The patient is treated with insulin daily. This patient requires frequent adjustments to their insulin treatment on the basis of therapeutic continuous glucose monitoring results by adjusting fixed doses of long acting insulin and sliding scale of fast acting insulin. In addition, the patient has been to our office for an evaluation of their diabetes control within the past 6 months.''\par \par \par Diabetic History\par ER Visits: none\par Hospitalizations: none\par Diet Therapy: not watching his diet much, son trying\par Diabetic Education: no\par Exercise: mod. active,\par Last eye exam: not yet\par \par Appears cardiology has started rosuvastatin 10 mg 4/2023 for HLD

## 2023-08-17 ENCOUNTER — APPOINTMENT (OUTPATIENT)
Dept: ENDOCRINOLOGY | Facility: CLINIC | Age: 78
End: 2023-08-17
Payer: MEDICARE

## 2023-08-17 VITALS
HEART RATE: 65 BPM | WEIGHT: 108 LBS | SYSTOLIC BLOOD PRESSURE: 145 MMHG | OXYGEN SATURATION: 98 % | DIASTOLIC BLOOD PRESSURE: 60 MMHG | BODY MASS INDEX: 22.57 KG/M2

## 2023-08-17 PROCEDURE — 95251 CONT GLUC MNTR ANALYSIS I&R: CPT

## 2023-08-17 PROCEDURE — 99214 OFFICE O/P EST MOD 30 MIN: CPT | Mod: 25

## 2023-08-17 NOTE — REVIEW OF SYSTEMS
[Fatigue] : no fatigue [Decreased Appetite] : appetite not decreased [Recent Weight Gain (___ Lbs)] : no recent weight gain [Recent Weight Loss (___ Lbs)] : no recent weight loss [Visual Field Defect] : no visual field defect [Dry Eyes] : no dryness [Eye Pain] : no pain [Dysphagia] : no dysphagia [Neck Pain] : no neck pain [Dysphonia] : no dysphonia [Chest Pain] : no chest pain [Slow Heart Rate] : heart rate is not slow [Palpitations] : no palpitations [Fast Heart Rate] : heart rate is not fast [Lower Ext Edema] : no lower extremity edema [Shortness Of Breath] : no shortness of breath [Cough] : no cough [Orthopnea] : no orthopnea [Nausea] : no nausea [Constipation] : no constipation [Abdominal Pain] : no abdominal pain [Vomiting] : no vomiting [Diarrhea] : no diarrhea [Polyuria] : no polyuria [Dysuria] : no dysuria [Joint Pain] : no joint pain [Muscle Weakness] : no muscle weakness [Myalgia] : no myalgia  [Acanthosis] : no acanthosis  [Acne] : no acne [Dry Skin] : no dry skin [Headaches] : no headaches [Dizziness] : no dizziness [Tremors] : no tremors [Depression] : no depression [Insomnia] : no insomnia [Anxiety] : no anxiety [Polydipsia] : no polydipsia [Cold Intolerance] : no cold intolerance [Heat Intolerance] : no heat intolerance [Easy Bleeding] : no ~M tendency for easy bleeding [Easy Bruising] : no tendency for easy bruising

## 2023-08-17 NOTE — ASSESSMENT
[FreeTextEntry1] : Kinsey is a 78 yr old female, who presents today for follow up in regards type 2 diabetes.   She does not speak good english, Belgian speaking, son accompanying. Per son she had stoke recently. Per patient has been diabetic since many years. Per son she has some hallucinations, she hears voices, and was eating sugar and not taking her meds. He says she hides food in her room thinking someone is going to poison it and then they  don't know when she eats, she does not sleep, wakes up many times at night and snacks. Was on glimepiride 4 mg daily but she was not taking it regularly.  Last visit we recommended to start Janumet  daily, first was not taking it regularly too,  son wants to give her, but he says she was very stubborn, now doing it regularly. Was on insulin, but she is reluctant to take it, not take for months.  current severity: uncontrolled, getting better A1C 9.5% in 10/21,10% in , 8.3%  in  , 7.2% in .  Home Glucose Monitoring Has dexcom, finally agreed to use it , download reviewed: Avg B SD SD:57% iN RANGE: 44% Above range: 56% below range: 0%  Will get blood wokr this visit.  Medication changes: To continue  janumet  daily  dailt. Diet modification discussed, eats waffles with syrup for breakfats, to add more proteins. Diet and exercise reviewed. Hypoglycemia reviewed.  Hallucinations: To see Psychiatry, recommended again This might improve compliance  Eyes: no  active issues,   to check for retinopathy Feet: no active issues, no neuropathy.  Diabetic foot care reviewed.  Lipids:   on statin/ anti-lipid treatment. Last LDL:106,  started statins, but per son she is not taking it  .will recheck this visit.  Renal/ B/P : B/P wnl , on ACE/ ARB. has proteinuria.  will recheck  this visit. Weight:   Balanced diet and exercise reviewed.   Diabetes counselling:  Spent a lot of time with son and her discussing diabetes management with her mental issues, will try to go slow and small changes so she can follow them. The patient was counseled on diabetes foot care, long term vascular complications of diabetes, carbohydrate consistent diet, importance of diet and exercise to improve glycemic control, achieve weight loss and improve cardiovascular health and action and use of short and long-acting insulin. Patient was referred to ophthalmology for retinopathy screening. ADA diet (30-50 gm carbohydrates with each meal, 15 grams with snacks. Balance with lean proteins and low fat diet.) Exercise daily per ability, work up to 30 minutes a day. Medication, risks and benefits reviewed. Glucose testing and insulin administration for glycemic management.  FOLLOWUP@3 months

## 2023-08-17 NOTE — HISTORY OF PRESENT ILLNESS
[FreeTextEntry1] : Kinsey is a 78 yr old female, who presents today for follow up in regards type 2 diabetes.   She does not speak good english, Lao speaking, son accompanying. Per son she had stoke recently. Per patient has been diabetic since many years. Per son she has some hallucinations, she hears voices, and was eating sugar and not taking her meds. He says she hides food in her room thinking someone is going to poison it and then they  don't know when she eats, she does not sleep, wakes up many times at night and snacks. Was on glimepiride 4 mg daily but she was not taking it regularly.  Last visit we recommended to start Janumet  daily, first was not taking it regularly too,  son wants to give her, but he says she was very stubborn, now doing it regularly. Was on insulin, but she is reluctant to take it, not take for months.  current severity: uncontrolled, getting better A1C 9.5% in 10/21,10% in , 8.3%  in  , 7.2% in .  Home Glucose Monitoring Has dexcom, finally agreed to use it , download reviewed: Avg B SD SD:57% iN RANGE: 44% Above range: 56% below range: 0%  ''This patient with diabetes TYPE 2  performs 4 glucose checks per day utilizing a continuous glucose monitor. The patient is treated with insulin  daily. This patient requires frequent adjustments to their insulin treatment on the basis of therapeutic continuous glucose monitoring results by adjusting fixed doses of long acting insulin and sliding scale of fast acting insulin. In addition, the patient has been to our office for an evaluation of their diabetes control within the past 6 months.''   Diabetic History ER Visits:  none Hospitalizations:   none Diet Therapy: not watching his diet much, son trying Diabetic Education:   no Exercise:   mod. active, Last eye exam: not yet, reminded again

## 2023-10-13 ENCOUNTER — APPOINTMENT (OUTPATIENT)
Dept: OPHTHALMOLOGY | Facility: CLINIC | Age: 78
End: 2023-10-13
Payer: MEDICARE

## 2023-10-13 ENCOUNTER — NON-APPOINTMENT (OUTPATIENT)
Age: 78
End: 2023-10-13

## 2023-10-13 PROCEDURE — 92250 FUNDUS PHOTOGRAPHY W/I&R: CPT

## 2023-10-13 PROCEDURE — 92004 COMPRE OPH EXAM NEW PT 1/>: CPT

## 2023-10-20 RX ORDER — METOPROLOL SUCCINATE 50 MG/1
50 TABLET, EXTENDED RELEASE ORAL DAILY
Qty: 90 | Refills: 3 | Status: ACTIVE | COMMUNITY
Start: 2022-03-14 | End: 1900-01-01

## 2023-11-04 ENCOUNTER — APPOINTMENT (OUTPATIENT)
Dept: OPHTHALMOLOGY | Facility: CLINIC | Age: 78
End: 2023-11-04

## 2023-11-06 ENCOUNTER — APPOINTMENT (OUTPATIENT)
Dept: CARDIOLOGY | Facility: CLINIC | Age: 78
End: 2023-11-06

## 2023-11-06 RX ORDER — CLOPIDOGREL BISULFATE 75 MG/1
75 TABLET, FILM COATED ORAL DAILY
Qty: 90 | Refills: 3 | Status: ACTIVE | COMMUNITY
Start: 2023-04-26 | End: 1900-01-01

## 2023-11-10 ENCOUNTER — APPOINTMENT (OUTPATIENT)
Dept: ENDOCRINOLOGY | Facility: CLINIC | Age: 78
End: 2023-11-10

## 2023-11-30 RX ORDER — FUROSEMIDE 20 MG/1
20 TABLET ORAL
Qty: 90 | Refills: 1 | Status: ACTIVE | COMMUNITY
Start: 2021-12-06 | End: 1900-01-01

## 2023-12-12 ENCOUNTER — APPOINTMENT (OUTPATIENT)
Dept: CARDIOLOGY | Facility: CLINIC | Age: 78
End: 2023-12-12

## 2023-12-28 LAB
ANION GAP SERPL CALC-SCNC: 11 MMOL/L
BUN SERPL-MCNC: 27 MG/DL
CALCIUM SERPL-MCNC: 9.7 MG/DL
CHLORIDE SERPL-SCNC: 102 MMOL/L
CHOLEST SERPL-MCNC: 193 MG/DL
CO2 SERPL-SCNC: 30 MMOL/L
CREAT SERPL-MCNC: 1.04 MG/DL
CREAT SPEC-SCNC: 18 MG/DL
EGFR: 55 ML/MIN/1.73M2
ESTIMATED AVERAGE GLUCOSE: 160 MG/DL
GLUCOSE SERPL-MCNC: 119 MG/DL
HBA1C MFR BLD HPLC: 7.2 %
HDLC SERPL-MCNC: 43 MG/DL
LDLC SERPL CALC-MCNC: 115 MG/DL
MICROALBUMIN 24H UR DL<=1MG/L-MCNC: 15.4 MG/DL
MICROALBUMIN/CREAT 24H UR-RTO: 847 MG/G
NONHDLC SERPL-MCNC: 151 MG/DL
POTASSIUM SERPL-SCNC: 4.3 MMOL/L
SODIUM SERPL-SCNC: 143 MMOL/L
TRIGL SERPL-MCNC: 207 MG/DL

## 2023-12-29 ENCOUNTER — APPOINTMENT (OUTPATIENT)
Dept: ENDOCRINOLOGY | Facility: CLINIC | Age: 78
End: 2023-12-29
Payer: MEDICARE

## 2023-12-29 VITALS
OXYGEN SATURATION: 95 % | SYSTOLIC BLOOD PRESSURE: 124 MMHG | HEIGHT: 58 IN | WEIGHT: 107 LBS | HEART RATE: 62 BPM | DIASTOLIC BLOOD PRESSURE: 72 MMHG | BODY MASS INDEX: 22.46 KG/M2

## 2023-12-29 LAB — GLUCOSE BLDC GLUCOMTR-MCNC: 189

## 2023-12-29 PROCEDURE — 99214 OFFICE O/P EST MOD 30 MIN: CPT | Mod: 25

## 2023-12-29 PROCEDURE — 82962 GLUCOSE BLOOD TEST: CPT

## 2023-12-29 PROCEDURE — 95251 CONT GLUC MNTR ANALYSIS I&R: CPT

## 2023-12-29 RX ORDER — BLOOD SUGAR DIAGNOSTIC
STRIP MISCELLANEOUS
Qty: 2 | Refills: 0 | Status: ACTIVE | COMMUNITY
Start: 2020-09-01 | End: 1900-01-01

## 2023-12-29 RX ORDER — LANCETS 28 GAUGE
EACH MISCELLANEOUS
Qty: 2 | Refills: 1 | Status: ACTIVE | COMMUNITY
Start: 2022-08-22 | End: 1900-01-01

## 2023-12-29 RX ORDER — LACTOSE-REDUCED FOOD
LIQUID (ML) ORAL
Qty: 90 | Refills: 1 | Status: ACTIVE | COMMUNITY
Start: 2021-10-11 | End: 1900-01-01

## 2023-12-29 RX ORDER — SITAGLIPTIN AND METFORMIN HYDROCHLORIDE 50; 1000 MG/1; MG/1
50-1000 TABLET, FILM COATED ORAL DAILY
Qty: 90 | Refills: 1 | Status: ACTIVE | COMMUNITY
Start: 2022-01-21 | End: 1900-01-01

## 2023-12-29 NOTE — ASSESSMENT
[FreeTextEntry1] : T2DM  -Pt appears to be doing better but likely not a candidate for tight control due to "voices in head" telling her to eat. She even has a dexcom to show her sugar isnt low but continues to panic correct blood sugars with spoonfulls of sugar. Also does not eat the best bfast choice (waffles/syurp) but is not likely to change. Son agrees  -Continue Janument 1 tab daily  -No longer requiring insulin (son self d.c)  -Continue dexcom use  -Must follow up with ophtho and podiatry    HLD  -Refuses to continue statin as perscribed by cardiology. Son wants to try metamucil  Should continue to follow with psychaitrity for hallcuinations- son not interested krupa lot of pysch meds due to side effects  RTO 3-4 months MD. Labs before next visit

## 2023-12-29 NOTE — REVIEW OF SYSTEMS
[Joint Pain] : joint pain [Fatigue] : no fatigue [Recent Weight Gain (___ Lbs)] : no recent weight gain [Recent Weight Loss (___ Lbs)] : no recent weight loss [Visual Field Defect] : no visual field defect [Blurred Vision] : no blurred vision [Dysphagia] : no dysphagia [Neck Pain] : no neck pain [Dysphonia] : no dysphonia [Chest Pain] : no chest pain [Shortness Of Breath] : no shortness of breath [Constipation] : no constipation [Diarrhea] : no diarrhea [Polyuria] : no polyuria [Polydipsia] : no polydipsia [FreeTextEntry9] : when on statin

## 2023-12-29 NOTE — PHYSICAL EXAM
[Alert] : alert [Well Nourished] : well nourished [No Acute Distress] : no acute distress [Normal Sclera/Conjunctiva] : normal sclera/conjunctiva [Normal Hearing] : hearing was normal [Thyroid Not Enlarged] : the thyroid was not enlarged [No Accessory Muscle Use] : no accessory muscle use [Clear to Auscultation] : lungs were clear to auscultation bilaterally [No Edema] : no peripheral edema [Normal Gait] : normal gait [No Rash] : no rash [No Tremors] : no tremors [de-identified] : irregular rhythm on asusculation [de-identified] : h/o dementia

## 2023-12-29 NOTE — HISTORY OF PRESENT ILLNESS
[FreeTextEntry1] : Interval history: Richelle has prescribed zolof PRN? son unsure  DM control much better but spikes everyday at 8/9am from her bfast choices  Kinsey is a 78 yr old female, who presents today for follow up in regards type 2 diabetes.  She does not speak good english, Croatian speaking , son accompanying.  Intial visit:  Per patient , has been diabetic since many years. Per son she has some hallucinations, she hears voices , and was eating sugar and not taking her meds. He says she hides food in her room thinking someone is going to poison it and then they don't know when she eats, she does not sleep, wakes up many times at night and snacks. Was on glimepiride 4 mg daily but she she was nottaking it regularly In past- we recommended to start janumet 50/1000 daily , first was not taking it regularly too, son wants to give her, but he says she was very stubborn , now doing it regularly.  Current regimen Janument 50/1000 mg once daily  Previously on insulin- no longer requires  current severity: stable A1C 7.2- 12/2023   Home Glucose Monitoring DEXCOM Average glucose 184 GMI N/A Standard deviation 46  10% Very high 36% High 54% In range 0^% Low 0% Very low  Highest 6a-9a-----when she is eating waffles and syrup or a bagel  ''This patient with diabetes TYPE 2 performs 4 glucose checks per day utilizing a continuous glucose monitor. The patient is treated with insulin daily. This patient requires frequent adjustments to their insulin treatment on the basis of therapeutic continuous glucose monitoring results by adjusting fixed doses of long acting insulin and sliding scale of fast acting insulin. In addition, the patient has been to our office for an evaluation of their diabetes control within the past 6 months.''   Diabetic History ER Visits: none Hospitalizations: none Diet Therapy: not watching his diet much, son trying Diabetic Education: no Exercise: mod. active, Last eye exam: not yet  Appears cardiology has started rosuvastatin 10 mg 4/2023 for HLD but pt has not been taking LDL above goal Son wants to try metamucil

## 2024-01-25 ENCOUNTER — APPOINTMENT (OUTPATIENT)
Dept: CARDIOLOGY | Facility: CLINIC | Age: 79
End: 2024-01-25

## 2024-03-14 ENCOUNTER — APPOINTMENT (OUTPATIENT)
Dept: VASCULAR SURGERY | Facility: CLINIC | Age: 79
End: 2024-03-14

## 2024-03-14 ENCOUNTER — APPOINTMENT (OUTPATIENT)
Dept: VASCULAR SURGERY | Facility: CLINIC | Age: 79
End: 2024-03-14
Payer: MEDICARE

## 2024-03-14 VITALS
RESPIRATION RATE: 16 BRPM | SYSTOLIC BLOOD PRESSURE: 154 MMHG | HEIGHT: 58 IN | BODY MASS INDEX: 22.67 KG/M2 | TEMPERATURE: 98.2 F | HEART RATE: 73 BPM | DIASTOLIC BLOOD PRESSURE: 73 MMHG | OXYGEN SATURATION: 97 % | WEIGHT: 108 LBS

## 2024-03-14 DIAGNOSIS — I73.9 PERIPHERAL VASCULAR DISEASE, UNSPECIFIED: ICD-10-CM

## 2024-03-14 PROCEDURE — 99203 OFFICE O/P NEW LOW 30 MIN: CPT

## 2024-03-14 PROCEDURE — 93923 UPR/LXTR ART STDY 3+ LVLS: CPT

## 2024-03-14 NOTE — HISTORY OF PRESENT ILLNESS
[FreeTextEntry1] : 78yo female PMH HTN,HLD, DM presents with referral from podiatry for bilateral lower extremity pain and PAD on arterial duplex. She is accompanied by her son who is providing translation. She complains of left arm pain that began last night. Her son reports that she has a history of cardiac stents. She reports cramping pain in both legs when walking for prolonged periods. Her son reports she had to take frequent breaks from walking at the grocery store for example, She denies, rest pain, nonhealing wounds, leg swelling, chest pain or shortness of breath.

## 2024-03-14 NOTE — PHYSICAL EXAM
[Normal Rate and Rhythm] : normal rate and rhythm [Ankle Swelling (On Exam)] : not present [] : not present [Varicose Veins Of Lower Extremities] : not present [Abdomen Tenderness] : ~T ~M No abdominal tenderness [Abdomen Masses] : No abdominal masses [Skin Ulcer] : no ulcer [No Rash or Lesion] : No rash or lesion [Calm] : calm [de-identified] : Appears well, in no acute distress. [de-identified] : normocephalic, atraumatic [de-identified] :  supple, no masses. [de-identified] :   normal respiratory effort. unlabored breathing. [de-identified] : Moves all extremities [de-identified] :  normal s1/s2 [de-identified] : dry, cracks in the skin distal edge of the L great toe and right toes. No ulcers or signsof infection.

## 2024-03-14 NOTE — ASSESSMENT
[FreeTextEntry1] : 78yo female PMH HTN, HLD, cardiac stents, HF, DM presents with referral from podiatry for bilateral lower extremity pain and PAD on arterial duplex. DEBORA/PVR performed in office today reveals a decrease in segmental pressures and brachial index right thigh to the calf and decreases throughout the LLE. DEBORA is 0.92 RLE and 0.62 LLE.  Arterial duplex reviewed with patient/son with >75% stenosis of the bilateral proximal and distal SFA, and complete occlusion of the PTA. No interventions indicated at this time as patient is without wounds or limiting claudication. Patient and son were educated on the importance of maintaining good glycemic control and preventing wounds. Follow up in 1 month to reassess claudication symptoms or sooner if she develops nonhealing wounds, worsening pain or rest pain. With regards to the left arm pain, I recommend patient go the ER for further assessment.

## 2024-03-18 ENCOUNTER — APPOINTMENT (OUTPATIENT)
Dept: CARDIOLOGY | Facility: CLINIC | Age: 79
End: 2024-03-18
Payer: MEDICARE

## 2024-03-18 VITALS
HEIGHT: 58 IN | WEIGHT: 108 LBS | SYSTOLIC BLOOD PRESSURE: 134 MMHG | BODY MASS INDEX: 22.67 KG/M2 | DIASTOLIC BLOOD PRESSURE: 82 MMHG | OXYGEN SATURATION: 96 % | HEART RATE: 70 BPM

## 2024-03-18 VITALS — SYSTOLIC BLOOD PRESSURE: 102 MMHG | DIASTOLIC BLOOD PRESSURE: 48 MMHG

## 2024-03-18 DIAGNOSIS — R01.1 CARDIAC MURMUR, UNSPECIFIED: ICD-10-CM

## 2024-03-18 DIAGNOSIS — I50.22 CHRONIC SYSTOLIC (CONGESTIVE) HEART FAILURE: ICD-10-CM

## 2024-03-18 DIAGNOSIS — I25.10 ATHEROSCLEROTIC HEART DISEASE OF NATIVE CORONARY ARTERY W/OUT ANGINA PECTORIS: ICD-10-CM

## 2024-03-18 PROCEDURE — 93000 ELECTROCARDIOGRAM COMPLETE: CPT

## 2024-03-18 PROCEDURE — 99214 OFFICE O/P EST MOD 30 MIN: CPT

## 2024-03-18 NOTE — HISTORY OF PRESENT ILLNESS
[FreeTextEntry1] : Son in attendance  CAD s/p CABG POST OP AF CHRONIC SYSTOLIC HEART FAILURE CVA PAD   A) Echo 3/23/2021 LVEF 35-40  B) C 3/23/2021: LVEF 40%. Severe  CAD..   C) s/p CABG x 3 (LIMA-LAD, SVG-Diag, SVG-PDA) Endarterectomy of the LAD and she is here for a post-op visit.  Surgery Date: 4/19/21.  Post Op FIDELINA: During hospital stay she had 4/23 bradycardia down and require pacing medications adjusted as per EP. Still overnight intermittent bradycardia requiring back p pacing, 4/26: Home EP cleared for D/C no indication for PPM. MCOT.  D) Pts son states pt was admitted to [facility] in Aug 2022 with facial droop and hemiparesis, cannot recall which side was affected, reports all symptoms reversed and pt was diagnosed with TIA. Upon review of records; MRI brain 8/2022 : acute infarct in the left robby. CTA with mod to severe stenosis of the communicating segment of the left ICA. Saw Neurologist associated with [facility] once in follow-up. (cannot recall name of neurologist).   E) Echo 5/26/2023 LVEF 45-50%. DD 2. LADP = 25 mmHg. Mild MR. Mild TR. RASP 43 mmHg.   F)  6/2023, Pt wore 30 day MCOT , with only 21 h monitoring. no AF. PVC 5%, on BB, does not want another monitor.  declines ILR, declines EP consultation, wants to continue current medical therapy, no invasive procedures.  G) PAD: Vascular Eval 3/14/2024 for  Intermittent claudication. DEBORA is 0.92 RLE and 0.62 LLE. Arterial duplex reviewed with patient/son with >75% stenosis of the bilateral proximal and distal SFA, and complete occlusion of the PTA. No interventions indicated at this time as patient is without wounds or limiting claudication. Follow up in 1 month to reassess claudication symptoms or sooner if she develops nonhealing wounds, worsening pain or rest pain. With regards to the left arm pain, I recommend patient go the ER for further assessment.        ROS Denied dyspnea, orthopnea, pnd, edema, cp , palpitation, syncope, near syncope.   FUNCTIONAL CAPACIOTY Est <4.0 METS  CARDIAC MEDS ASA 81 mg daily Lasix 20 mg daily Metoprolol Succ 50 mg daily Entresto 24-26 mg BID Rosuvastatin 10mg daily (not yet started)  FUNCTIONAL CAPACITY Walks w/o assistance Walks in the house  CHRONIC, STABLE CONDITIONSCAD:

## 2024-03-18 NOTE — DISCUSSION/SUMMARY
[Patient] : the patient [___ Week(s)] : in [unfilled] week(s) [EKG obtained to assist in diagnosis and management of assessed problem(s)] : EKG obtained to assist in diagnosis and management of assessed problem(s)

## 2024-03-18 NOTE — PHYSICAL EXAM
[Well Nourished] : well nourished [Well Developed] : well developed [Normal Conjunctiva] : normal conjunctiva [No Acute Distress] : no acute distress [Normal Venous Pressure] : normal venous pressure [No Carotid Bruit] : no carotid bruit [Normal S1, S2] : normal S1, S2 [No Rub] : no rub [No Gallop] : no gallop [Good Air Entry] : good air entry [Clear Lung Fields] : clear lung fields [No Respiratory Distress] : no respiratory distress  [Soft] : abdomen soft [Non Tender] : non-tender [No Masses/organomegaly] : no masses/organomegaly [Normal Bowel Sounds] : normal bowel sounds [Normal Gait] : normal gait [No Edema] : no edema [No Cyanosis] : no cyanosis [No Clubbing] : no clubbing [No Varicosities] : no varicosities [No Skin Lesions] : no skin lesions [No Rash] : no rash [Moves all extremities] : moves all extremities [No Focal Deficits] : no focal deficits [Normal Speech] : normal speech [Alert and Oriented] : alert and oriented [Normal memory] : normal memory [de-identified] : 1/6 [facility]

## 2024-03-18 NOTE — CARDIOLOGY SUMMARY
[de-identified] : 3/18/2024 Sinus Rhythm -Short WI syndrome  Bladimir = 116 -Right bundle branch block. ABNORMAL   5/24/2021 Sinus  Rhythm  -Right bundle branch block.   -Left atrial enlargement.  ABNORMAL

## 2024-03-19 ENCOUNTER — APPOINTMENT (OUTPATIENT)
Dept: CARDIOLOGY | Facility: CLINIC | Age: 79
End: 2024-03-19
Payer: MEDICARE

## 2024-03-19 VITALS — DIASTOLIC BLOOD PRESSURE: 80 MMHG | SYSTOLIC BLOOD PRESSURE: 120 MMHG | HEART RATE: 67 BPM

## 2024-03-19 PROCEDURE — 93306 TTE W/DOPPLER COMPLETE: CPT

## 2024-03-26 ENCOUNTER — NON-APPOINTMENT (OUTPATIENT)
Age: 79
End: 2024-03-26

## 2024-03-26 LAB
ALBUMIN SERPL ELPH-MCNC: 4 G/DL
ALP BLD-CCNC: 71 U/L
ALT SERPL-CCNC: 10 U/L
ANION GAP SERPL CALC-SCNC: 11 MMOL/L
ANION GAP SERPL CALC-SCNC: 14 MMOL/L
AST SERPL-CCNC: 13 U/L
BASOPHILS # BLD AUTO: 0.07 K/UL
BASOPHILS NFR BLD AUTO: 0.8 %
BILIRUB SERPL-MCNC: 0.2 MG/DL
BUN SERPL-MCNC: 25 MG/DL
BUN SERPL-MCNC: 25 MG/DL
CALCIUM SERPL-MCNC: 9.5 MG/DL
CALCIUM SERPL-MCNC: 9.5 MG/DL
CHLORIDE SERPL-SCNC: 105 MMOL/L
CHLORIDE SERPL-SCNC: 106 MMOL/L
CHOLEST SERPL-MCNC: 168 MG/DL
CO2 SERPL-SCNC: 28 MMOL/L
CO2 SERPL-SCNC: 28 MMOL/L
CREAT SERPL-MCNC: 0.87 MG/DL
CREAT SERPL-MCNC: 0.89 MG/DL
EGFR: 66 ML/MIN/1.73M2
EGFR: 68 ML/MIN/1.73M2
EOSINOPHIL # BLD AUTO: 0.18 K/UL
EOSINOPHIL NFR BLD AUTO: 2.2 %
ESTIMATED AVERAGE GLUCOSE: 166 MG/DL
GLUCOSE SERPL-MCNC: 220 MG/DL
GLUCOSE SERPL-MCNC: 220 MG/DL
HBA1C MFR BLD HPLC: 7.4 %
HCT VFR BLD CALC: 42.2 %
HDLC SERPL-MCNC: 40 MG/DL
HGB BLD-MCNC: 13.3 G/DL
IMM GRANULOCYTES NFR BLD AUTO: 0.4 %
LDLC SERPL CALC-MCNC: 98 MG/DL
LYMPHOCYTES # BLD AUTO: 1.39 K/UL
LYMPHOCYTES NFR BLD AUTO: 16.6 %
MAN DIFF?: NORMAL
MCHC RBC-ENTMCNC: 29.3 PG
MCHC RBC-ENTMCNC: 31.5 GM/DL
MCV RBC AUTO: 93 FL
MONOCYTES # BLD AUTO: 0.66 K/UL
MONOCYTES NFR BLD AUTO: 7.9 %
NEUTROPHILS # BLD AUTO: 6.03 K/UL
NEUTROPHILS NFR BLD AUTO: 72.1 %
NONHDLC SERPL-MCNC: 128 MG/DL
PLATELET # BLD AUTO: 346 K/UL
POTASSIUM SERPL-SCNC: 3.8 MMOL/L
POTASSIUM SERPL-SCNC: 4 MMOL/L
PROT SERPL-MCNC: 7.1 G/DL
RBC # BLD: 4.54 M/UL
RBC # FLD: 13.5 %
SODIUM SERPL-SCNC: 144 MMOL/L
SODIUM SERPL-SCNC: 147 MMOL/L
T3FREE SERPL-MCNC: 2.05 PG/ML
T4 FREE SERPL-MCNC: 1 NG/DL
TRIGL SERPL-MCNC: 172 MG/DL
TSH SERPL-ACNC: 2.23 UIU/ML
VIT B12 SERPL-MCNC: 415 PG/ML
WBC # FLD AUTO: 8.36 K/UL

## 2024-03-26 RX ORDER — SACUBITRIL AND VALSARTAN 49; 51 MG/1; MG/1
49-51 TABLET, FILM COATED ORAL
Qty: 180 | Refills: 3 | Status: ACTIVE | COMMUNITY
Start: 2021-12-06 | End: 1900-01-01

## 2024-04-12 ENCOUNTER — APPOINTMENT (OUTPATIENT)
Dept: ENDOCRINOLOGY | Facility: CLINIC | Age: 79
End: 2024-04-12
Payer: MEDICARE

## 2024-04-12 VITALS
OXYGEN SATURATION: 97 % | SYSTOLIC BLOOD PRESSURE: 120 MMHG | HEART RATE: 60 BPM | BODY MASS INDEX: 22.67 KG/M2 | WEIGHT: 108 LBS | HEIGHT: 58 IN | DIASTOLIC BLOOD PRESSURE: 72 MMHG

## 2024-04-12 DIAGNOSIS — I10 ESSENTIAL (PRIMARY) HYPERTENSION: ICD-10-CM

## 2024-04-12 DIAGNOSIS — E78.00 PURE HYPERCHOLESTEROLEMIA, UNSPECIFIED: ICD-10-CM

## 2024-04-12 DIAGNOSIS — E11.9 TYPE 2 DIABETES MELLITUS W/OUT COMPLICATIONS: ICD-10-CM

## 2024-04-12 DIAGNOSIS — Z86.39 PERSONAL HISTORY OF OTHER ENDOCRINE, NUTRITIONAL AND METABOLIC DISEASE: ICD-10-CM

## 2024-04-12 LAB — GLUCOSE BLDC GLUCOMTR-MCNC: 132

## 2024-04-12 PROCEDURE — 95251 CONT GLUC MNTR ANALYSIS I&R: CPT

## 2024-04-12 PROCEDURE — 82962 GLUCOSE BLOOD TEST: CPT

## 2024-04-12 PROCEDURE — 99214 OFFICE O/P EST MOD 30 MIN: CPT

## 2024-04-12 RX ORDER — ADHESIVE TAPE 3"X 2.3 YD
50 MCG TAPE, NON-MEDICATED TOPICAL
Qty: 90 | Refills: 0 | Status: ACTIVE | COMMUNITY
Start: 2024-04-12 | End: 1900-01-01

## 2024-04-12 RX ORDER — INSULIN GLARGINE 100 [IU]/ML
100 INJECTION, SOLUTION SUBCUTANEOUS
Qty: 5 | Refills: 3 | Status: ACTIVE | COMMUNITY
Start: 2022-01-21 | End: 1900-01-01

## 2024-04-12 RX ORDER — PEN NEEDLE, DIABETIC 29 G X1/2"
32G X 4 MM NEEDLE, DISPOSABLE MISCELLANEOUS
Qty: 1 | Refills: 1 | Status: ACTIVE | COMMUNITY
Start: 2022-01-21 | End: 1900-01-01

## 2024-04-12 NOTE — HISTORY OF PRESENT ILLNESS
[FreeTextEntry1] : Kinsey is a 79 yr old female, who presents today for follow up in regards type 2 diabetes.   She does not speak good english, Ugandan speaking, son accompanying. Per son she had stoke recently. Per patient has been diabetic since many years. Per son she has some hallucinations, she hears voices, and was eating sugar and not taking her meds. He says she hides food in her room thinking someone is going to poison it and then they  don't know when she eats, she does not sleep, wakes up many times at night and snacks. Was on glimepiride 4 mg daily but she was not taking it regularly.  Last visit we recommended to start Janumet  daily, first was not taking it regularly too,   now doing it regularly. Was on insulin, but she is reluctant to take it, not take for months.  current severity: uncontrolled, getting better A1C 9.5% in 10/21,10% in , 8.3%  in  , 7.2% in , 7.4% IN 3/24 Fingerstick in clinic: 132 Home Glucose Monitoring Has dexcom, finally agreed to use it , download reviewed: Avg B SD SD:48% iN RANGE: 27% Above range: 76% below range: 0%   Sugars lately high, she has been urinating a lot, has UTI.  ''This patient with diabetes TYPE 2  performs 4 glucose checks per day utilizing a continuous glucose monitor. The patient is treated with insulin  daily. This patient requires frequent adjustments to their insulin treatment on the basis of therapeutic continuous glucose monitoring results by adjusting fixed doses of long acting insulin and sliding scale of fast acting insulin. In addition, the patient has been to our office for an evaluation of their diabetes control within the past 6 months.''   Diabetic History ER Visits:  none Hospitalizations:   none Diet Therapy: not watching his diet much, son trying Diabetic Education:   no Exercise:   mod. active, Last eye exam: last year.

## 2024-04-12 NOTE — PHYSICAL EXAM
[Alert] : alert [Well Nourished] : well nourished [No Acute Distress] : no acute distress [Normal Sclera/Conjunctiva] : normal sclera/conjunctiva [No Proptosis] : no proptosis [No Lid Lag] : no lid lag [No Neck Mass] : no neck mass was observed [Thyroid Not Enlarged] : the thyroid was not enlarged [No Thyroid Nodules] : no palpable thyroid nodules [No Respiratory Distress] : no respiratory distress [No Accessory Muscle Use] : no accessory muscle use [Clear to Auscultation] : lungs were clear to auscultation bilaterally [Normal S1, S2] : normal S1 and S2 [Normal Rate] : heart rate was normal [Regular Rhythm] : with a regular rhythm [Normal] : normal [Oriented x3] : oriented to person, place, and time [Normal Affect] : the affect was normal [Normal Mood] : the mood was normal [Diminished Throughout Both Feet] : normal tactile sensation with monofilament testing throughout both feet

## 2024-04-12 NOTE — ASSESSMENT
[FreeTextEntry1] :  Kinsey is a 79 yr old female, who presents today for follow up in regards type 2 diabetes.   She does not speak good english, [place] speaking, son accompanying. Per son she had stoke recently. Per patient has been diabetic since many years. Per son she has some hallucinations, she hears voices, and was eating sugar and not taking her meds. He says she hides food in her room thinking someone is going to poison it and then they  don't know when she eats, she does not sleep, wakes up many times at night and snacks. Was on glimepiride 4 mg daily but she was not taking it regularly.  Last visit we recommended to start Janumet  daily, first was not taking it regularly too,   now doing it regularly. Was on insulin, but she is reluctant to take it, not take for months.  current severity: uncontrolled, getting better A1C 9.5% in 10/21,10% in , 8.3%  in  , 7.2% in , 7.4% IN 3/24 Fingerstick in clinic: 132 Home Glucose Monitoring Has dexcom, finally agreed to use it , download reviewed: Avg B SD SD:48% iN RANGE: 27% Above range: 76% below range: 0%   Sugars lately high, she has been urinating a lot, has UTI.  Medication changes: To continue  janumet  daily   To start lantus 8 units daily. Diet modification discussed again,  eats waffles with syrup for breakfats, to add more proteins. Diet and exercise reviewed. Hypoglycemia reviewed.  Hallucinations: To see Psychiatry, recommended again This might improve compliance  Eyes: no  active issues,   to check for retinopathy Feet: no active issues, no neuropathy.  Diabetic foot care reviewed.  Lipids:   on statin/ anti-lipid treatment. Last LDL:98.  Renal/ B/P : B/P wnl , on ACE/ ARB. has proteinuria.  will check next visit. Weight:   Balanced diet and exercise reviewed.   Diabetes counselling:  Spent a lot of time with son and her discussing diabetes management with her mental issues, will try to go slow and small changes so she can follow them. The patient was counseled on diabetes foot care, long term vascular complications of diabetes, carbohydrate consistent diet, importance of diet and exercise to improve glycemic control, achieve weight loss and improve cardiovascular health and action and use of short and long-acting insulin. Patient was referred to ophthalmology for retinopathy screening. ADA diet (30-50 gm carbohydrates with each meal, 15 grams with snacks. Balance with lean proteins and low fat diet.) Exercise daily per ability, work up to 30 minutes a day. Medication, risks and benefits reviewed. Glucose testing and insulin administration for glycemic management.  FOLLOWUP@3 months

## 2024-04-18 ENCOUNTER — APPOINTMENT (OUTPATIENT)
Dept: VASCULAR SURGERY | Facility: CLINIC | Age: 79
End: 2024-04-18

## 2024-06-03 ENCOUNTER — APPOINTMENT (OUTPATIENT)
Dept: CARDIOLOGY | Facility: CLINIC | Age: 79
End: 2024-06-03

## 2024-07-08 ENCOUNTER — APPOINTMENT (OUTPATIENT)
Dept: CARDIOLOGY | Facility: CLINIC | Age: 79
End: 2024-07-08
Payer: MEDICARE

## 2024-07-08 VITALS
WEIGHT: 104 LBS | HEART RATE: 61 BPM | SYSTOLIC BLOOD PRESSURE: 122 MMHG | BODY MASS INDEX: 21.83 KG/M2 | DIASTOLIC BLOOD PRESSURE: 60 MMHG | TEMPERATURE: 98.2 F | OXYGEN SATURATION: 95 % | HEIGHT: 58 IN

## 2024-07-08 DIAGNOSIS — I50.22 CHRONIC SYSTOLIC (CONGESTIVE) HEART FAILURE: ICD-10-CM

## 2024-07-08 DIAGNOSIS — I25.10 ATHEROSCLEROTIC HEART DISEASE OF NATIVE CORONARY ARTERY W/OUT ANGINA PECTORIS: ICD-10-CM

## 2024-07-08 DIAGNOSIS — I73.9 PERIPHERAL VASCULAR DISEASE, UNSPECIFIED: ICD-10-CM

## 2024-07-08 DIAGNOSIS — Z95.1 PRESENCE OF AORTOCORONARY BYPASS GRAFT: ICD-10-CM

## 2024-07-08 PROCEDURE — 99214 OFFICE O/P EST MOD 30 MIN: CPT

## 2024-07-08 PROCEDURE — 93000 ELECTROCARDIOGRAM COMPLETE: CPT

## 2024-07-08 RX ORDER — DICLOFENAC SODIUM 1% 10 MG/G
1 GEL TOPICAL
Qty: 100 | Refills: 0 | Status: ACTIVE | COMMUNITY
Start: 2024-03-25

## 2024-07-08 RX ORDER — SPIRONOLACTONE 25 MG/1
25 TABLET ORAL
Qty: 15 | Refills: 0 | Status: ACTIVE | COMMUNITY
Start: 2024-07-08 | End: 1900-01-01

## 2024-07-08 RX ORDER — RISPERIDONE 0.5 MG/1
0.5 TABLET, FILM COATED ORAL
Qty: 30 | Refills: 0 | Status: ACTIVE | COMMUNITY
Start: 2024-06-26

## 2024-07-08 RX ORDER — SERTRALINE 25 MG/1
25 TABLET, FILM COATED ORAL
Qty: 30 | Refills: 0 | Status: ACTIVE | COMMUNITY
Start: 2024-04-25

## 2024-07-11 ENCOUNTER — RX RENEWAL (OUTPATIENT)
Age: 79
End: 2024-07-11

## 2024-07-22 LAB
ANION GAP SERPL CALC-SCNC: 14 MMOL/L
BUN SERPL-MCNC: 32 MG/DL
CALCIUM SERPL-MCNC: 9.6 MG/DL
CHLORIDE SERPL-SCNC: 105 MMOL/L
CO2 SERPL-SCNC: 27 MMOL/L
CREAT SERPL-MCNC: 1.15 MG/DL
EGFR: 48 ML/MIN/1.73M2
GLUCOSE SERPL-MCNC: 78 MG/DL
MAGNESIUM SERPL-MCNC: 2.2 MG/DL
POTASSIUM SERPL-SCNC: 4.1 MMOL/L
SODIUM SERPL-SCNC: 146 MMOL/L

## 2024-08-13 ENCOUNTER — RX RENEWAL (OUTPATIENT)
Age: 79
End: 2024-08-13

## 2024-08-23 ENCOUNTER — APPOINTMENT (OUTPATIENT)
Dept: ENDOCRINOLOGY | Facility: CLINIC | Age: 79
End: 2024-08-23
Payer: MEDICARE

## 2024-08-23 VITALS
HEART RATE: 61 BPM | HEIGHT: 58 IN | OXYGEN SATURATION: 97 % | WEIGHT: 103 LBS | BODY MASS INDEX: 21.62 KG/M2 | SYSTOLIC BLOOD PRESSURE: 119 MMHG | DIASTOLIC BLOOD PRESSURE: 60 MMHG

## 2024-08-23 DIAGNOSIS — Z86.39 PERSONAL HISTORY OF OTHER ENDOCRINE, NUTRITIONAL AND METABOLIC DISEASE: ICD-10-CM

## 2024-08-23 DIAGNOSIS — E11.9 TYPE 2 DIABETES MELLITUS W/OUT COMPLICATIONS: ICD-10-CM

## 2024-08-23 DIAGNOSIS — I10 ESSENTIAL (PRIMARY) HYPERTENSION: ICD-10-CM

## 2024-08-23 DIAGNOSIS — E78.00 PURE HYPERCHOLESTEROLEMIA, UNSPECIFIED: ICD-10-CM

## 2024-08-23 LAB — GLUCOSE BLDC GLUCOMTR-MCNC: 128

## 2024-08-23 PROCEDURE — 82962 GLUCOSE BLOOD TEST: CPT

## 2024-08-23 PROCEDURE — 99214 OFFICE O/P EST MOD 30 MIN: CPT

## 2024-08-23 PROCEDURE — 95251 CONT GLUC MNTR ANALYSIS I&R: CPT

## 2024-08-23 RX ORDER — ERGOCALCIFEROL 1.25 MG/1
50000 CAPSULE ORAL
Qty: 12 | Refills: 1 | Status: ACTIVE | COMMUNITY
Start: 2024-08-23 | End: 1900-01-01

## 2024-08-23 NOTE — ASSESSMENT
[FreeTextEntry1] :  Kinsey is a 79 yr old female, who presents today for follow up in regards type 2 diabetes.   She does not speak good english, [place] speaking, son accompanying. Per son she had stoke recently. Per patient has been diabetic since many years. Per son she was having some hallucinations, hearing voices, and was eating sugar and not taking her meds. Now seen psych, on meds, doing better.  Now on  Janumet  daily.  last visit we also recommended to start insulin because her sugars were high, but now since she is not eating all the time, sugars better , so insulin was not started.   current severity: uncontrolled, getting better A1C 9.5% in 10/21,10% in , 8.3%  in  , 7.2% in , 7.4%  in 3/24 Fingerstick in clinic: 128 Home Glucose Monitoring Has dexcom, finally agreed to use it , download reviewed: Avg B SD:44% iN RANGE: 58% Above range: 42% below range: 0% sugars better this time, but Cr elevated, will repeat renal functions, UA.  Medication changes: To continue  janumet  daily   for now, to recheck Cr.and then adjust meds. Diet modification discussed again, Diet and exercise reviewed. Hypoglycemia reviewed.  Hallucinations: To see Psychiatry, recommended again This might improve compliance  Eyes: no  active issues,   to check for retinopathy Feet: no active issues, no neuropathy.  Diabetic foot care reviewed.  Lipids:   on statin/ anti-lipid treatment. Last LDL:98.  Renal/ B/P : B/P wnl , on ACE/ ARB. has proteinuria.  will check  this visit. Weight:   Balanced diet and exercise reviewed.   Diabetes counselling:  Spent a lot of time with son and her discussing diabetes management with her mental issues, will try to go slow and small changes so she can follow them. The patient was counseled on diabetes foot care, long term vascular complications of diabetes, carbohydrate consistent diet, importance of diet and exercise to improve glycemic control, achieve weight loss and improve cardiovascular health and action and use of short and long-acting insulin. Patient was referred to ophthalmology for retinopathy screening. ADA diet (30-50 gm carbohydrates with each meal, 15 grams with snacks. Balance with lean proteins and low fat diet.) Exercise daily per ability, work up to 30 minutes a day. Medication, risks and benefits reviewed. Glucose testing and insulin administration for glycemic management.  FOLLOWUP@3 to 4  months

## 2024-08-23 NOTE — HISTORY OF PRESENT ILLNESS
[FreeTextEntry1] : Kinsey is a 79 yr old female, who presents today for follow up in regards type 2 diabetes.   She does not speak good english, English speaking, son accompanying. Per son she had stoke recently. Per patient has been diabetic since many years. Per son she was having some hallucinations, hearing voices, and was eating sugar and not taking her meds. Now seen psych, on meds, doing better.  Now on  Janumet  daily.  last visit we also recommended to start insulin because her sugars were high, but now since she is not eating all the time, sugars better , so insulin was not started.   current severity: uncontrolled, getting better A1C 9.5% in 10/21,10% in , 8.3%  in  , 7.2% in , 7.4%  in 3/24 Fingerstick in clinic: 128 Home Glucose Monitoring Has dexcom, finally agreed to use it , download reviewed: Avg B SD:44% iN RANGE: 58% Above range: 42% below range: 0%  ''This patient with diabetes TYPE 2  performs 4 glucose checks per day utilizing a continuous glucose monitor. The patient is treated with insulin  daily. This patient requires frequent adjustments to their insulin treatment on the basis of therapeutic continuous glucose monitoring results by adjusting fixed doses of long acting insulin and sliding scale of fast acting insulin. In addition, the patient has been to our office for an evaluation of their diabetes control within the past 6 months.''   Diabetic History ER Visits:  none Hospitalizations:   none Diet Therapy: not watching his diet much, son trying Diabetic Education:   no Exercise:   mod. active, Last eye exam: last year.

## 2024-08-23 NOTE — PHYSICAL EXAM
[Alert] : alert [Well Nourished] : well nourished [No Acute Distress] : no acute distress [Normal Sclera/Conjunctiva] : normal sclera/conjunctiva [No Neck Mass] : no neck mass was observed [Thyroid Not Enlarged] : the thyroid was not enlarged [No Thyroid Nodules] : no palpable thyroid nodules [No Respiratory Distress] : no respiratory distress [No Accessory Muscle Use] : no accessory muscle use [Clear to Auscultation] : lungs were clear to auscultation bilaterally [Normal S1, S2] : normal S1 and S2 [Normal Rate] : heart rate was normal [Regular Rhythm] : with a regular rhythm [Normal] : normal [Oriented x3] : oriented to person, place, and time [Normal Affect] : the affect was normal [Normal Mood] : the mood was normal [Diminished Throughout Both Feet] : normal tactile sensation with monofilament testing throughout both feet

## 2024-08-30 ENCOUNTER — LABORATORY RESULT (OUTPATIENT)
Age: 79
End: 2024-08-30

## 2024-10-03 ENCOUNTER — APPOINTMENT (OUTPATIENT)
Dept: CARDIOLOGY | Facility: CLINIC | Age: 79
End: 2024-10-03

## 2024-10-11 ENCOUNTER — LABORATORY RESULT (OUTPATIENT)
Age: 79
End: 2024-10-11

## 2025-01-09 ENCOUNTER — APPOINTMENT (OUTPATIENT)
Dept: CARDIOLOGY | Facility: CLINIC | Age: 80
End: 2025-01-09

## 2025-01-16 ENCOUNTER — RX RENEWAL (OUTPATIENT)
Age: 80
End: 2025-01-16

## 2025-01-27 ENCOUNTER — APPOINTMENT (OUTPATIENT)
Dept: CARDIOLOGY | Facility: CLINIC | Age: 80
End: 2025-01-27
Payer: MEDICARE

## 2025-01-27 ENCOUNTER — NON-APPOINTMENT (OUTPATIENT)
Age: 80
End: 2025-01-27

## 2025-01-27 VITALS
WEIGHT: 96 LBS | BODY MASS INDEX: 20.15 KG/M2 | SYSTOLIC BLOOD PRESSURE: 140 MMHG | OXYGEN SATURATION: 98 % | HEIGHT: 58 IN | HEART RATE: 67 BPM | DIASTOLIC BLOOD PRESSURE: 62 MMHG

## 2025-01-27 DIAGNOSIS — I49.3 VENTRICULAR PREMATURE DEPOLARIZATION: ICD-10-CM

## 2025-01-27 DIAGNOSIS — I50.22 CHRONIC SYSTOLIC (CONGESTIVE) HEART FAILURE: ICD-10-CM

## 2025-01-27 DIAGNOSIS — I25.10 ATHEROSCLEROTIC HEART DISEASE OF NATIVE CORONARY ARTERY W/OUT ANGINA PECTORIS: ICD-10-CM

## 2025-01-27 DIAGNOSIS — I63.9 CEREBRAL INFARCTION, UNSPECIFIED: ICD-10-CM

## 2025-01-27 PROCEDURE — 99214 OFFICE O/P EST MOD 30 MIN: CPT

## 2025-01-27 PROCEDURE — 93000 ELECTROCARDIOGRAM COMPLETE: CPT

## 2025-02-21 ENCOUNTER — APPOINTMENT (OUTPATIENT)
Dept: ENDOCRINOLOGY | Facility: CLINIC | Age: 80
End: 2025-02-21
Payer: MEDICARE

## 2025-02-21 VITALS
BODY MASS INDEX: 20.36 KG/M2 | DIASTOLIC BLOOD PRESSURE: 66 MMHG | WEIGHT: 97 LBS | HEART RATE: 61 BPM | HEIGHT: 58 IN | OXYGEN SATURATION: 96 % | SYSTOLIC BLOOD PRESSURE: 130 MMHG

## 2025-02-21 DIAGNOSIS — E78.00 PURE HYPERCHOLESTEROLEMIA, UNSPECIFIED: ICD-10-CM

## 2025-02-21 DIAGNOSIS — E11.9 TYPE 2 DIABETES MELLITUS W/OUT COMPLICATIONS: ICD-10-CM

## 2025-02-21 DIAGNOSIS — I10 ESSENTIAL (PRIMARY) HYPERTENSION: ICD-10-CM

## 2025-02-21 LAB — GLUCOSE BLDC GLUCOMTR-MCNC: 88

## 2025-02-21 PROCEDURE — 95251 CONT GLUC MNTR ANALYSIS I&R: CPT

## 2025-02-21 PROCEDURE — 82962 GLUCOSE BLOOD TEST: CPT

## 2025-02-21 PROCEDURE — 99214 OFFICE O/P EST MOD 30 MIN: CPT

## 2025-03-18 ENCOUNTER — LABORATORY RESULT (OUTPATIENT)
Age: 80
End: 2025-03-18

## 2025-04-18 ENCOUNTER — APPOINTMENT (OUTPATIENT)
Dept: OTOLARYNGOLOGY | Facility: CLINIC | Age: 80
End: 2025-04-18
Payer: MEDICARE

## 2025-04-18 ENCOUNTER — NON-APPOINTMENT (OUTPATIENT)
Age: 80
End: 2025-04-18

## 2025-04-18 VITALS
BODY MASS INDEX: 20.99 KG/M2 | WEIGHT: 100 LBS | HEART RATE: 60 BPM | HEIGHT: 58 IN | SYSTOLIC BLOOD PRESSURE: 145 MMHG | DIASTOLIC BLOOD PRESSURE: 57 MMHG

## 2025-04-18 DIAGNOSIS — H61.20 IMPACTED CERUMEN, UNSPECIFIED EAR: ICD-10-CM

## 2025-04-18 DIAGNOSIS — H91.10 PRESBYCUSIS, UNSPECIFIED EAR: ICD-10-CM

## 2025-04-18 PROCEDURE — G0268 REMOVAL OF IMPACTED WAX MD: CPT

## 2025-04-18 PROCEDURE — 99203 OFFICE O/P NEW LOW 30 MIN: CPT | Mod: 25

## 2025-04-18 PROCEDURE — 92557 COMPREHENSIVE HEARING TEST: CPT

## 2025-04-18 PROCEDURE — 92567 TYMPANOMETRY: CPT

## 2025-06-11 ENCOUNTER — NON-APPOINTMENT (OUTPATIENT)
Age: 80
End: 2025-06-11

## 2025-06-20 NOTE — BRIEF OPERATIVE NOTE - NSICDXBRIEFPOSTOP_GEN_ALL_CORE_FT
POST-OP DIAGNOSIS:  CAD (coronary artery disease) 19-Apr-2021 12:17:23  Jose Cruz   Initiate Treatment: Clindamycin topical solution. Detail Level: Zone Render In Strict Bullet Format?: No

## 2025-07-03 ENCOUNTER — APPOINTMENT (OUTPATIENT)
Dept: CARDIOLOGY | Facility: CLINIC | Age: 80
End: 2025-07-03
Payer: MEDICARE

## 2025-07-03 VITALS
BODY MASS INDEX: 20.57 KG/M2 | DIASTOLIC BLOOD PRESSURE: 56 MMHG | OXYGEN SATURATION: 96 % | SYSTOLIC BLOOD PRESSURE: 150 MMHG | HEART RATE: 60 BPM | WEIGHT: 98 LBS | HEIGHT: 58 IN

## 2025-07-03 VITALS — SYSTOLIC BLOOD PRESSURE: 160 MMHG | DIASTOLIC BLOOD PRESSURE: 58 MMHG

## 2025-07-03 VITALS — DIASTOLIC BLOOD PRESSURE: 62 MMHG | SYSTOLIC BLOOD PRESSURE: 138 MMHG

## 2025-07-03 PROCEDURE — 99214 OFFICE O/P EST MOD 30 MIN: CPT

## 2025-07-03 PROCEDURE — 93000 ELECTROCARDIOGRAM COMPLETE: CPT

## 2025-08-22 ENCOUNTER — NON-APPOINTMENT (OUTPATIENT)
Age: 80
End: 2025-08-22